# Patient Record
Sex: MALE | Race: WHITE | Employment: OTHER | URBAN - METROPOLITAN AREA
[De-identification: names, ages, dates, MRNs, and addresses within clinical notes are randomized per-mention and may not be internally consistent; named-entity substitution may affect disease eponyms.]

---

## 2021-02-11 ENCOUNTER — APPOINTMENT (OUTPATIENT)
Dept: GENERAL RADIOLOGY | Age: 86
DRG: 312 | End: 2021-02-11
Payer: MEDICARE

## 2021-02-11 ENCOUNTER — HOSPITAL ENCOUNTER (INPATIENT)
Age: 86
LOS: 2 days | Discharge: OTHER INSTITUTION | DRG: 312 | End: 2021-02-15
Attending: EMERGENCY MEDICINE | Admitting: INTERNAL MEDICINE
Payer: MEDICARE

## 2021-02-11 ENCOUNTER — APPOINTMENT (OUTPATIENT)
Dept: CT IMAGING | Age: 86
DRG: 312 | End: 2021-02-11
Payer: MEDICARE

## 2021-02-11 DIAGNOSIS — R55 SYNCOPE, UNSPECIFIED SYNCOPE TYPE: Primary | ICD-10-CM
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
## 2021-02-11 DIAGNOSIS — S09.90XA CLOSED HEAD INJURY, INITIAL ENCOUNTER: ICD-10-CM

## 2021-02-11 DIAGNOSIS — S00.01XA ABRASION OF SCALP, INITIAL ENCOUNTER: ICD-10-CM

## 2021-02-11 LAB
ALBUMIN SERPL-MCNC: 3.6 G/DL (ref 3.5–5.2)
ALP BLD-CCNC: 102 U/L (ref 40–129)
ALT SERPL-CCNC: 21 U/L (ref 0–40)
ANION GAP SERPL CALCULATED.3IONS-SCNC: 7 MMOL/L (ref 7–16)
AST SERPL-CCNC: 18 U/L (ref 0–39)
BASOPHILS ABSOLUTE: 0.02 E9/L (ref 0–0.2)
BASOPHILS RELATIVE PERCENT: 0.2 % (ref 0–2)
BILIRUB SERPL-MCNC: 0.4 MG/DL (ref 0–1.2)
BUN BLDV-MCNC: 13 MG/DL (ref 8–23)
CALCIUM SERPL-MCNC: 8.9 MG/DL (ref 8.6–10.2)
CHLORIDE BLD-SCNC: 104 MMOL/L (ref 98–107)
CO2: 29 MMOL/L (ref 22–29)
CREAT SERPL-MCNC: 1 MG/DL (ref 0.7–1.2)
EKG ATRIAL RATE: 80 BPM
EKG ATRIAL RATE: 82 BPM
EKG P AXIS: 49 DEGREES
EKG P AXIS: 50 DEGREES
EKG P-R INTERVAL: 136 MS
EKG P-R INTERVAL: 164 MS
EKG Q-T INTERVAL: 358 MS
EKG Q-T INTERVAL: 364 MS
EKG QRS DURATION: 82 MS
EKG QRS DURATION: 84 MS
EKG QTC CALCULATION (BAZETT): 412 MS
EKG QTC CALCULATION (BAZETT): 425 MS
EKG R AXIS: 54 DEGREES
EKG R AXIS: 73 DEGREES
EKG T AXIS: 51 DEGREES
EKG T AXIS: 68 DEGREES
EKG VENTRICULAR RATE: 80 BPM
EKG VENTRICULAR RATE: 82 BPM
EOSINOPHILS ABSOLUTE: 0.11 E9/L (ref 0.05–0.5)
EOSINOPHILS RELATIVE PERCENT: 1.2 % (ref 0–6)
GFR AFRICAN AMERICAN: >60
GFR NON-AFRICAN AMERICAN: >60 ML/MIN/1.73
GLUCOSE BLD-MCNC: 228 MG/DL (ref 74–99)
HCT VFR BLD CALC: 39.5 % (ref 37–54)
HEMOGLOBIN: 12.5 G/DL (ref 12.5–16.5)
IMMATURE GRANULOCYTES #: 0.1 E9/L
IMMATURE GRANULOCYTES %: 1.1 % (ref 0–5)
LYMPHOCYTES ABSOLUTE: 1.04 E9/L (ref 1.5–4)
LYMPHOCYTES RELATIVE PERCENT: 11.2 % (ref 20–42)
MCH RBC QN AUTO: 30.7 PG (ref 26–35)
MCHC RBC AUTO-ENTMCNC: 31.6 % (ref 32–34.5)
MCV RBC AUTO: 97.1 FL (ref 80–99.9)
METER GLUCOSE: 197 MG/DL (ref 74–99)
METER GLUCOSE: 199 MG/DL (ref 74–99)
MONOCYTES ABSOLUTE: 0.57 E9/L (ref 0.1–0.95)
MONOCYTES RELATIVE PERCENT: 6.1 % (ref 2–12)
NEUTROPHILS ABSOLUTE: 7.47 E9/L (ref 1.8–7.3)
NEUTROPHILS RELATIVE PERCENT: 80.2 % (ref 43–80)
PDW BLD-RTO: 13.2 FL (ref 11.5–15)
PLATELET # BLD: 202 E9/L (ref 130–450)
PMV BLD AUTO: 11.2 FL (ref 7–12)
POTASSIUM SERPL-SCNC: 4.4 MMOL/L (ref 3.5–5)
PRO-BNP: 100 PG/ML (ref 0–450)
RBC # BLD: 4.07 E12/L (ref 3.8–5.8)
SODIUM BLD-SCNC: 140 MMOL/L (ref 132–146)
TOTAL PROTEIN: 5.7 G/DL (ref 6.4–8.3)
TROPONIN: <0.01 NG/ML (ref 0–0.03)
WBC # BLD: 9.3 E9/L (ref 4.5–11.5)

## 2021-02-11 PROCEDURE — 99285 EMERGENCY DEPT VISIT HI MDM: CPT

## 2021-02-11 PROCEDURE — 2580000003 HC RX 258: Performed by: INTERNAL MEDICINE

## 2021-02-11 PROCEDURE — 96372 THER/PROPH/DIAG INJ SC/IM: CPT

## 2021-02-11 PROCEDURE — 71045 X-RAY EXAM CHEST 1 VIEW: CPT

## 2021-02-11 PROCEDURE — 70450 CT HEAD/BRAIN W/O DYE: CPT

## 2021-02-11 PROCEDURE — 93010 ELECTROCARDIOGRAM REPORT: CPT | Performed by: INTERNAL MEDICINE

## 2021-02-11 PROCEDURE — 90715 TDAP VACCINE 7 YRS/> IM: CPT | Performed by: EMERGENCY MEDICINE

## 2021-02-11 PROCEDURE — 83880 ASSAY OF NATRIURETIC PEPTIDE: CPT

## 2021-02-11 PROCEDURE — 82962 GLUCOSE BLOOD TEST: CPT

## 2021-02-11 PROCEDURE — G0378 HOSPITAL OBSERVATION PER HR: HCPCS

## 2021-02-11 PROCEDURE — 6360000002 HC RX W HCPCS: Performed by: EMERGENCY MEDICINE

## 2021-02-11 PROCEDURE — 85025 COMPLETE CBC W/AUTO DIFF WBC: CPT

## 2021-02-11 PROCEDURE — 6370000000 HC RX 637 (ALT 250 FOR IP): Performed by: EMERGENCY MEDICINE

## 2021-02-11 PROCEDURE — 84484 ASSAY OF TROPONIN QUANT: CPT

## 2021-02-11 PROCEDURE — 72125 CT NECK SPINE W/O DYE: CPT

## 2021-02-11 PROCEDURE — 80053 COMPREHEN METABOLIC PANEL: CPT

## 2021-02-11 PROCEDURE — 93005 ELECTROCARDIOGRAM TRACING: CPT | Performed by: EMERGENCY MEDICINE

## 2021-02-11 PROCEDURE — 6370000000 HC RX 637 (ALT 250 FOR IP): Performed by: INTERNAL MEDICINE

## 2021-02-11 PROCEDURE — 6360000002 HC RX W HCPCS: Performed by: INTERNAL MEDICINE

## 2021-02-11 PROCEDURE — 90471 IMMUNIZATION ADMIN: CPT | Performed by: EMERGENCY MEDICINE

## 2021-02-11 RX ORDER — DEXTROSE MONOHYDRATE 50 MG/ML
100 INJECTION, SOLUTION INTRAVENOUS PRN
Status: DISCONTINUED | OUTPATIENT
Start: 2021-02-11 | End: 2021-02-15 | Stop reason: HOSPADM

## 2021-02-11 RX ORDER — CITALOPRAM 20 MG/1
20 TABLET ORAL DAILY
Status: DISCONTINUED | OUTPATIENT
Start: 2021-02-11 | End: 2021-02-15 | Stop reason: HOSPADM

## 2021-02-11 RX ORDER — GLIMEPIRIDE 2 MG/1
2 TABLET ORAL
Status: DISCONTINUED | OUTPATIENT
Start: 2021-02-12 | End: 2021-02-15 | Stop reason: HOSPADM

## 2021-02-11 RX ORDER — ASPIRIN 81 MG/1
81 TABLET, CHEWABLE ORAL DAILY
Status: DISCONTINUED | OUTPATIENT
Start: 2021-02-11 | End: 2021-02-15 | Stop reason: HOSPADM

## 2021-02-11 RX ORDER — SODIUM CHLORIDE 0.9 % (FLUSH) 0.9 %
10 SYRINGE (ML) INJECTION EVERY 12 HOURS SCHEDULED
Status: DISCONTINUED | OUTPATIENT
Start: 2021-02-11 | End: 2021-02-15 | Stop reason: HOSPADM

## 2021-02-11 RX ORDER — POLYETHYLENE GLYCOL 3350 17 G/17G
17 POWDER, FOR SOLUTION ORAL DAILY PRN
Status: DISCONTINUED | OUTPATIENT
Start: 2021-02-11 | End: 2021-02-15 | Stop reason: HOSPADM

## 2021-02-11 RX ORDER — CHOLECALCIFEROL (VITAMIN D3) 50 MCG
2000 TABLET ORAL DAILY
Status: DISCONTINUED | OUTPATIENT
Start: 2021-02-11 | End: 2021-02-15 | Stop reason: HOSPADM

## 2021-02-11 RX ORDER — BUPROPION HYDROCHLORIDE 75 MG/1
75 TABLET ORAL 2 TIMES DAILY
Status: DISCONTINUED | OUTPATIENT
Start: 2021-02-11 | End: 2021-02-15 | Stop reason: HOSPADM

## 2021-02-11 RX ORDER — SODIUM CHLORIDE 0.9 % (FLUSH) 0.9 %
10 SYRINGE (ML) INJECTION PRN
Status: DISCONTINUED | OUTPATIENT
Start: 2021-02-11 | End: 2021-02-15 | Stop reason: HOSPADM

## 2021-02-11 RX ORDER — TIMOLOL MALEATE 5 MG/ML
1 SOLUTION/ DROPS OPHTHALMIC 2 TIMES DAILY
Status: DISCONTINUED | OUTPATIENT
Start: 2021-02-11 | End: 2021-02-15 | Stop reason: HOSPADM

## 2021-02-11 RX ORDER — TIMOLOL MALEATE 5 MG/ML
1 SOLUTION/ DROPS OPHTHALMIC 2 TIMES DAILY
Status: ON HOLD | COMMUNITY
End: 2021-02-15 | Stop reason: HOSPADM

## 2021-02-11 RX ORDER — SODIUM CHLORIDE 450 MG/100ML
INJECTION, SOLUTION INTRAVENOUS CONTINUOUS
Status: DISCONTINUED | OUTPATIENT
Start: 2021-02-11 | End: 2021-02-15

## 2021-02-11 RX ORDER — PROMETHAZINE HYDROCHLORIDE 25 MG/1
12.5 TABLET ORAL EVERY 6 HOURS PRN
Status: DISCONTINUED | OUTPATIENT
Start: 2021-02-11 | End: 2021-02-15 | Stop reason: HOSPADM

## 2021-02-11 RX ORDER — ACETAMINOPHEN 325 MG/1
650 TABLET ORAL EVERY 6 HOURS PRN
Status: DISCONTINUED | OUTPATIENT
Start: 2021-02-11 | End: 2021-02-15 | Stop reason: HOSPADM

## 2021-02-11 RX ORDER — DIAPER,BRIEF,INFANT-TODD,DISP
EACH MISCELLANEOUS ONCE
Status: COMPLETED | OUTPATIENT
Start: 2021-02-11 | End: 2021-02-11

## 2021-02-11 RX ORDER — LATANOPROST 50 UG/ML
1 SOLUTION/ DROPS OPHTHALMIC NIGHTLY
Status: DISCONTINUED | OUTPATIENT
Start: 2021-02-11 | End: 2021-02-15 | Stop reason: HOSPADM

## 2021-02-11 RX ORDER — NICOTINE POLACRILEX 4 MG
15 LOZENGE BUCCAL PRN
Status: DISCONTINUED | OUTPATIENT
Start: 2021-02-11 | End: 2021-02-15 | Stop reason: HOSPADM

## 2021-02-11 RX ORDER — ONDANSETRON 2 MG/ML
4 INJECTION INTRAMUSCULAR; INTRAVENOUS EVERY 6 HOURS PRN
Status: DISCONTINUED | OUTPATIENT
Start: 2021-02-11 | End: 2021-02-15 | Stop reason: HOSPADM

## 2021-02-11 RX ORDER — SIMVASTATIN 10 MG
20 TABLET ORAL NIGHTLY
Status: DISCONTINUED | OUTPATIENT
Start: 2021-02-11 | End: 2021-02-15 | Stop reason: HOSPADM

## 2021-02-11 RX ORDER — DOCUSATE SODIUM 100 MG/1
100 CAPSULE, LIQUID FILLED ORAL 2 TIMES DAILY
Status: DISCONTINUED | OUTPATIENT
Start: 2021-02-11 | End: 2021-02-15 | Stop reason: HOSPADM

## 2021-02-11 RX ORDER — SIMVASTATIN 20 MG
20 TABLET ORAL EVERY MORNING
COMMUNITY

## 2021-02-11 RX ORDER — ACETAMINOPHEN 650 MG/1
650 SUPPOSITORY RECTAL EVERY 6 HOURS PRN
Status: DISCONTINUED | OUTPATIENT
Start: 2021-02-11 | End: 2021-02-15 | Stop reason: HOSPADM

## 2021-02-11 RX ORDER — DEXTROSE MONOHYDRATE 25 G/50ML
12.5 INJECTION, SOLUTION INTRAVENOUS PRN
Status: DISCONTINUED | OUTPATIENT
Start: 2021-02-11 | End: 2021-02-15 | Stop reason: HOSPADM

## 2021-02-11 RX ADMIN — Medication 2000 UNITS: at 17:06

## 2021-02-11 RX ADMIN — ASPIRIN 81 MG: 81 TABLET, CHEWABLE ORAL at 17:06

## 2021-02-11 RX ADMIN — SODIUM CHLORIDE, PRESERVATIVE FREE 10 ML: 5 INJECTION INTRAVENOUS at 19:51

## 2021-02-11 RX ADMIN — SODIUM CHLORIDE: 4.5 INJECTION, SOLUTION INTRAVENOUS at 17:25

## 2021-02-11 RX ADMIN — DOCUSATE SODIUM 100 MG: 100 CAPSULE, LIQUID FILLED ORAL at 20:25

## 2021-02-11 RX ADMIN — BUPROPION HYDROCHLORIDE 75 MG: 75 TABLET, FILM COATED ORAL at 19:50

## 2021-02-11 RX ADMIN — LATANOPROST 1 DROP: 50 SOLUTION OPHTHALMIC at 19:51

## 2021-02-11 RX ADMIN — CITALOPRAM 20 MG: 20 TABLET, FILM COATED ORAL at 17:07

## 2021-02-11 RX ADMIN — INSULIN LISPRO 1 UNITS: 100 INJECTION, SOLUTION INTRAVENOUS; SUBCUTANEOUS at 17:06

## 2021-02-11 RX ADMIN — BACITRACIN ZINC: 500 OINTMENT TOPICAL at 09:47

## 2021-02-11 RX ADMIN — TIMOLOL MALEATE 1 DROP: 5 SOLUTION OPHTHALMIC at 19:51

## 2021-02-11 RX ADMIN — TETANUS TOXOID, REDUCED DIPHTHERIA TOXOID AND ACELLULAR PERTUSSIS VACCINE, ADSORBED 0.5 ML: 5; 2.5; 8; 8; 2.5 SUSPENSION INTRAMUSCULAR at 09:47

## 2021-02-11 RX ADMIN — INSULIN LISPRO 1 UNITS: 100 INJECTION, SOLUTION INTRAVENOUS; SUBCUTANEOUS at 19:52

## 2021-02-11 RX ADMIN — ENOXAPARIN SODIUM 40 MG: 40 INJECTION SUBCUTANEOUS at 17:06

## 2021-02-11 NOTE — ED NOTES
Patient belongings, Jose Ma in patient belonging bag and on back of patient bed.       Pranav Aguirre RN  02/11/21 2894

## 2021-02-11 NOTE — PROGRESS NOTES
Debara Kayser was ordered simvastatin (Zocor) which is a nonformulary medication. Nurse is going to check with patient to see if home supply of this medication will be brought in to the hospital for inpatient use. A pharmacist will follow-up with the nurse of the patient to assess the capability of the patient to bring in the medication. If it is determined that the patient cannot supply the medication and it is not available to be dispensed from the pharmacy, a call will be placed to the ordering provider to discuss alternative options.      Mina Reyes, AngeliqueD  02/11/21 3:53 PM

## 2021-02-11 NOTE — ED NOTES
Bed: 05  Expected date:   Expected time:   Means of arrival:   Comments:  fadumo Vickers RN  02/11/21 0147

## 2021-02-11 NOTE — ED PROVIDER NOTES
Department of Emergency Medicine   ED  Provider Note  Admit Date/RoomTime: 2/11/2021  8:56 AM  ED Room: 4508/4508-A          History of Present Illness:  2/11/21, Time: 9:33 AM EST  Chief Complaint   Patient presents with   [address] HAD SHORT POS LOC PT AMNESTIC TO EVENT AND 2ND TIME FELL THIS AM ACCORDING TO EMS    Facial Laceration     FOREHEAD                 Tracee Ward is a 80 y.o. male presenting to the ED for fall at home, beginning 1 hour. The complaint has been intermittent, moderate in severity, and worsened by nothing. Patient presents for a fall. Patient does not remember the fall, states he was at home thinks he was walking down steps to go outside. Positive loss of consciousness. Does not recall if he lost consciousness before the fall or after the fall. EMS reports the patient had 2 falls this morning but patient is unsure of this. He is on daily aspirin. There is signs of craniofacial trauma with abrasions on the head. He is unsure of his last tetanus but is otherwise orientated. Review of Systems:   Pertinent positives and negatives are stated within HPI, all other systems reviewed and are negative.        --------------------------------------------- PAST HISTORY ---------------------------------------------  Past Medical History:  has a past medical history of ADD (attention deficit disorder), Anxiety, B12 deficiency, B12 deficiency, Depression, Diabetes ([facility] [address] Diabetes mellitus ([facility] [address] Hyperlipidemia, and Hypertension. Past Surgical History:  has a past surgical history that includes Hand surgery. Social History:  reports that he has quit smoking. He has never used smokeless tobacco. He reports that he does not drink alcohol or use drugs. Family History: family history is not on file. . Unless otherwise noted, family history is non contributory    The patients home medications have been reviewed.     Allergies: Patient has no known allergies. ---------------------------------------------------PHYSICAL EXAM--------------------------------------    Constitutional/General: Alert and oriented x3  Head: Normocephalic there is a frontal cephalhematoma with abrasion on the front right as well as the superior central area of the head. No lacerations for closure  Eyes: PERRL, EOMI, sclera non icteric  Mouth: Oropharynx clear, handling secretions, no trismus, no asymmetry of the posterior oropharynx or uvular edema  Neck: Supple, immobilized in a cervical collar no stridor, no meningeal signs  Respiratory: Lungs clear to auscultation bilaterally,Not in respiratory distress  Cardiovascular:  Regular rate. Regular rhythm. 2+ distal pulses. Equal extremity pulses. Chest: No chest wall tenderness  GI:  Abdomen Soft, Non tender, Non distended. No rebound, guarding, or rigidity. Musculoskeletal: Moves all extremities x 4. Warm and well perfused, no clubbing, cyanosis, or edema. Capillary refill <3 seconds likely skin tear on the right forearm which is currently dressed. Neurovascularly intact. He is able to flex all large joints no signs of long bone deformities  Integument: skin warm and dry. No rashes. Neurologic: GCS 15, no focal deficits, symmetric strength 5/5 in the upper and lower extremities bilaterally  Psychiatric: Normal Affect      EKG: Interpreted by emergency department physician, Dr. Gifford Speaks      This EKG is signed and interpreted by me. Rate: 80  Rhythm: Sinus  Interpretation: Sinus rhythm, MA is 136, QRS is 84, QTc is 412. Motion artifact no other acute findings no prior for comparison  Comparison: no previous EKG available        -------------------------------------------------- RESULTS -------------------------------------------------  I have personally reviewed all laboratory and imaging results for this patient. Results are listed below.      LABS: (Lab results interpreted by me)  Results for orders placed or performed during the hospital encounter of 02/11/21   Troponin   Result Value Ref Range    Troponin <0.01 0.00 - 0.03 ng/mL   CBC Auto Differential   Result Value Ref Range    WBC 9.3 4.5 - 11.5 E9/L    RBC 4.07 3.80 - 5.80 E12/L    Hemoglobin 12.5 12.5 - 16.5 g/dL    Hematocrit 39.5 37.0 - 54.0 %    MCV 97.1 80.0 - 99.9 fL    MCH 30.7 26.0 - 35.0 pg    MCHC 31.6 (L) 32.0 - 34.5 %    RDW 13.2 11.5 - 15.0 fL    Platelets 499 922 - 156 E9/L    MPV 11.2 7.0 - 12.0 fL    Neutrophils % 80.2 (H) 43.0 - 80.0 %    Immature Granulocytes % 1.1 0.0 - 5.0 %    Lymphocytes % 11.2 (L) 20.0 - 42.0 %    Monocytes % 6.1 2.0 - 12.0 %    Eosinophils % 1.2 0.0 - 6.0 %    Basophils % 0.2 0.0 - 2.0 %    Neutrophils Absolute 7.47 (H) 1.80 - 7.30 E9/L    Immature Granulocytes # 0.10 E9/L    Lymphocytes Absolute 1.04 (L) 1.50 - 4.00 E9/L    Monocytes Absolute 0.57 0.10 - 0.95 E9/L    Eosinophils Absolute 0.11 0.05 - 0.50 E9/L    Basophils Absolute 0.02 0.00 - 0.20 E9/L   Comprehensive Metabolic Panel   Result Value Ref Range    Sodium 140 132 - 146 mmol/L    Potassium 4.4 3.5 - 5.0 mmol/L    Chloride 104 98 - 107 mmol/L    CO2 29 22 - 29 mmol/L    Anion Gap 7 7 - 16 mmol/L    Glucose 228 (H) 74 - 99 mg/dL    BUN 13 8 - 23 mg/dL    CREATININE 1.0 0.7 - 1.2 mg/dL    GFR Non-African American >60 >=60 mL/min/1.73    GFR African American >60     Calcium 8.9 8.6 - 10.2 mg/dL    Total Protein 5.7 (L) 6.4 - 8.3 g/dL    Albumin 3.6 3.5 - 5.2 g/dL    Total Bilirubin 0.4 0.0 - 1.2 mg/dL    Alkaline Phosphatase 102 40 - 129 U/L    ALT 21 0 - 40 U/L    AST 18 0 - 39 U/L   Brain Natriuretic Peptide   Result Value Ref Range    Pro- 0 - 450 pg/mL   EKG 12 Lead   Result Value Ref Range    Ventricular Rate 82 BPM    Atrial Rate 82 BPM    P-R Interval 164 ms    QRS Duration 82 ms    Q-T Interval 364 ms    QTc Calculation (Bazett) 425 ms    P Axis 50 degrees    R Axis 73 degrees    T Axis 68 degrees   EKG 12 Lead   Result Value Ref Range    Ventricular tablet 2 mg (has no administration in time range)   latanoprost (XALATAN) 0.005 % ophthalmic solution 1 drop (has no administration in time range)   simvastatin (ZOCOR) tablet 20 mg (has no administration in time range)   timolol (TIMOPTIC) 0.5 % ophthalmic solution 1 drop (has no administration in time range)   aspirin chewable tablet 81 mg (has no administration in time range)   sodium chloride flush 0.9 % injection 10 mL (has no administration in time range)   sodium chloride flush 0.9 % injection 10 mL (has no administration in time range)   enoxaparin (LOVENOX) injection 40 mg (has no administration in time range)   promethazine (PHENERGAN) tablet 12.5 mg (has no administration in time range)     Or   ondansetron (ZOFRAN) injection 4 mg (has no administration in time range)   polyethylene glycol (GLYCOLAX) packet 17 g (has no administration in time range)   acetaminophen (TYLENOL) tablet 650 mg (has no administration in time range)     Or   acetaminophen (TYLENOL) suppository 650 mg (has no administration in time range)   insulin lispro (HUMALOG) injection vial 0-6 Units (has no administration in time range)   insulin lispro (HUMALOG) injection vial 0-3 Units (has no administration in time range)   glucose (GLUTOSE) 40 % oral gel 15 g (has no administration in time range)   dextrose 50 % IV solution (has no administration in time range)   glucagon (rDNA) injection 1 mg (has no administration in time range)   dextrose 5 % solution (has no administration in time range)   Tetanus-Diphth-Acell Pertussis (BOOSTRIX) injection 0.5 mL (0.5 mLs Intramuscular Given 2/11/21 0947)   bacitracin zinc ointment ( Topical Given 2/11/21 0947)                    Medical Decision Making:     I, Dr. Chi Bain am the primary provider of record    No acute traumatic intracranial injuries identified. Syncopal episode strongly considered.   Lives at home alone, full code, spoke with medicine patient kept for further care      Re-Evaluations:          Re-evaluation. Patients symptoms are improving  Repeat physical examination is improved        This patient's ED course included: a personal history and physicial examination, re-evaluation prior to disposition, IV medications, cardiac monitoring, continuous pulse oximetry and complex medical decision making and emergency management    This patient has been closely monitored during their ED course. Consultations:  Sharona Gipson for Nicole Cotto MD       Critical Care:         Counseling: The emergency provider has spoken with the patient and discussed todays results, in addition to providing specific details for the plan of care and counseling regarding the diagnosis and prognosis. Questions are answered at this time and they are agreeable with the plan.       --------------------------------- IMPRESSION AND DISPOSITION ---------------------------------    IMPRESSION  1. Syncope, unspecified syncope type    2. Fall at home, initial encounter    3. Abrasion of scalp, initial encounter    4. Closed head injury, initial encounter        DISPOSITION  Disposition: Admit to telemetry  Patient condition is stable        NOTE: This report was transcribed using voice recognition software.  Every effort was made to ensure accuracy; however, inadvertent computerized transcription errors may be present       Opal Landers DO  02/11/21 [address] Danie Vega DO  02/11/21 0013

## 2021-02-12 LAB
BASOPHILS ABSOLUTE: 0.03 E9/L (ref 0–0.2)
BASOPHILS RELATIVE PERCENT: 0.2 % (ref 0–2)
EOSINOPHILS ABSOLUTE: 0.01 E9/L (ref 0.05–0.5)
EOSINOPHILS RELATIVE PERCENT: 0.1 % (ref 0–6)
HCT VFR BLD CALC: 34.4 % (ref 37–54)
HEMOGLOBIN: 11.2 G/DL (ref 12.5–16.5)
IMMATURE GRANULOCYTES #: 0.08 E9/L
IMMATURE GRANULOCYTES %: 0.6 % (ref 0–5)
LYMPHOCYTES ABSOLUTE: 1.31 E9/L (ref 1.5–4)
LYMPHOCYTES RELATIVE PERCENT: 10.6 % (ref 20–42)
MCH RBC QN AUTO: 31.2 PG (ref 26–35)
MCHC RBC AUTO-ENTMCNC: 32.6 % (ref 32–34.5)
MCV RBC AUTO: 95.8 FL (ref 80–99.9)
METER GLUCOSE: 109 MG/DL (ref 74–99)
METER GLUCOSE: 121 MG/DL (ref 74–99)
METER GLUCOSE: 129 MG/DL (ref 74–99)
METER GLUCOSE: 152 MG/DL (ref 74–99)
MONOCYTES ABSOLUTE: 1.09 E9/L (ref 0.1–0.95)
MONOCYTES RELATIVE PERCENT: 8.8 % (ref 2–12)
NEUTROPHILS ABSOLUTE: 9.87 E9/L (ref 1.8–7.3)
NEUTROPHILS RELATIVE PERCENT: 79.7 % (ref 43–80)
PDW BLD-RTO: 13.2 FL (ref 11.5–15)
PLATELET # BLD: 178 E9/L (ref 130–450)
PMV BLD AUTO: 11.5 FL (ref 7–12)
RBC # BLD: 3.59 E12/L (ref 3.8–5.8)
WBC # BLD: 12.4 E9/L (ref 4.5–11.5)

## 2021-02-12 PROCEDURE — 36415 COLL VENOUS BLD VENIPUNCTURE: CPT

## 2021-02-12 PROCEDURE — 96372 THER/PROPH/DIAG INJ SC/IM: CPT

## 2021-02-12 PROCEDURE — 6370000000 HC RX 637 (ALT 250 FOR IP): Performed by: INTERNAL MEDICINE

## 2021-02-12 PROCEDURE — G0378 HOSPITAL OBSERVATION PER HR: HCPCS

## 2021-02-12 PROCEDURE — 99222 1ST HOSP IP/OBS MODERATE 55: CPT | Performed by: INTERNAL MEDICINE

## 2021-02-12 PROCEDURE — 85025 COMPLETE CBC W/AUTO DIFF WBC: CPT

## 2021-02-12 PROCEDURE — 82962 GLUCOSE BLOOD TEST: CPT

## 2021-02-12 PROCEDURE — 6360000002 HC RX W HCPCS: Performed by: INTERNAL MEDICINE

## 2021-02-12 PROCEDURE — 2580000003 HC RX 258: Performed by: INTERNAL MEDICINE

## 2021-02-12 PROCEDURE — APPSS60 APP SPLIT SHARED TIME 46-60 MINUTES: Performed by: PHYSICIAN ASSISTANT

## 2021-02-12 RX ORDER — MIDODRINE HYDROCHLORIDE 5 MG/1
5 TABLET ORAL
Status: DISCONTINUED | OUTPATIENT
Start: 2021-02-12 | End: 2021-02-15 | Stop reason: HOSPADM

## 2021-02-12 RX ADMIN — POLYETHYLENE GLYCOL 3350 17 G: 17 POWDER, FOR SOLUTION ORAL at 20:03

## 2021-02-12 RX ADMIN — MIDODRINE HYDROCHLORIDE 5 MG: 5 TABLET ORAL at 20:03

## 2021-02-12 RX ADMIN — BUPROPION HYDROCHLORIDE 75 MG: 75 TABLET, FILM COATED ORAL at 20:03

## 2021-02-12 RX ADMIN — SODIUM CHLORIDE, PRESERVATIVE FREE 10 ML: 5 INJECTION INTRAVENOUS at 20:04

## 2021-02-12 RX ADMIN — ENOXAPARIN SODIUM 40 MG: 40 INJECTION SUBCUTANEOUS at 10:17

## 2021-02-12 RX ADMIN — GLIMEPIRIDE 2 MG: 2 TABLET ORAL at 06:15

## 2021-02-12 RX ADMIN — SODIUM CHLORIDE, PRESERVATIVE FREE 10 ML: 5 INJECTION INTRAVENOUS at 10:18

## 2021-02-12 RX ADMIN — MIDODRINE HYDROCHLORIDE 5 MG: 5 TABLET ORAL at 12:05

## 2021-02-12 RX ADMIN — TIMOLOL MALEATE 1 DROP: 5 SOLUTION OPHTHALMIC at 10:17

## 2021-02-12 RX ADMIN — TIMOLOL MALEATE 1 DROP: 5 SOLUTION OPHTHALMIC at 20:03

## 2021-02-12 RX ADMIN — ASPIRIN 81 MG: 81 TABLET, CHEWABLE ORAL at 10:17

## 2021-02-12 RX ADMIN — CITALOPRAM 20 MG: 20 TABLET, FILM COATED ORAL at 10:17

## 2021-02-12 RX ADMIN — DOCUSATE SODIUM 100 MG: 100 CAPSULE, LIQUID FILLED ORAL at 10:17

## 2021-02-12 RX ADMIN — LATANOPROST 1 DROP: 50 SOLUTION OPHTHALMIC at 20:03

## 2021-02-12 RX ADMIN — BUPROPION HYDROCHLORIDE 75 MG: 75 TABLET, FILM COATED ORAL at 10:17

## 2021-02-12 RX ADMIN — DOCUSATE SODIUM 100 MG: 100 CAPSULE, LIQUID FILLED ORAL at 20:03

## 2021-02-12 RX ADMIN — Medication 2000 UNITS: at 06:15

## 2021-02-12 NOTE — H&P
Lucita Villasenor is a 80 y.o. male  Chief Complaint   Patient presents with    Fall      [address] [facility] POS LOC PT AMNESTIC TO EVENT AND 2ND TIME FELL THIS AM ACCORDING TO EMS    Facial Laceration     FOREHEAD      HPI  As above, he is awake and alert this am.  He lives in his home with his wife. He fell down the stairs yesterday, he admits to hitting his head and passing out. Last night, he tried to get out of bed and passed out. He has not been out of bed since. He denies any cp, sob, n/v or HA. No current facility-administered medications on file prior to encounter. Current Outpatient Medications on File Prior to Encounter   Medication Sig Dispense Refill    simvastatin (ZOCOR) 20 MG tablet Take 20 mg by mouth nightly      timolol (TIMOPTIC) 0.5 % ophthalmic solution Place 1 drop into the right eye 2 times daily      buPROPion (WELLBUTRIN) 75 MG tablet Take 1 tablet by mouth 2 times daily 180 tablet 1    citalopram (CELEXA) 20 MG tablet Take 1 tablet by mouth daily 90 tablet 1    glimepiride (AMARYL) 4 MG tablet Take 0.5 tablets by mouth every morning (before breakfast) 90 tablet 1    latanoprost (XALATAN) 0.005 % ophthalmic solution Place 1 drop into the right eye nightly   5    docusate sodium (COLACE) 100 MG capsule Take 100 mg by mouth 2 times daily      aspirin 81 MG chewable tablet Take 81 mg by mouth daily.  Cholecalciferol (VITAMIN D) 2000 UNITS CAPS capsule Take 2,000 Units by mouth Daily       Multiple Vitamins-Minerals (MULTIVITAMIN PO) Take 1 tablet by mouth Daily.        Past Medical History:   Diagnosis Date    ADD (attention deficit disorder)     Anxiety     B12 deficiency     B12 deficiency     Depression     Diabetes ([facility] [address]     Diabetes mellitus ([facility] [address]     Hyperlipidemia     Hypertension      Past Surgical History:   Procedure Laterality Date    HAND SURGERY       Social History     Socioeconomic History    Marital status:  Spouse name: Not on file    Number of children: Not on file    Years of education: Not on file    Highest education level: Not on file   Occupational History    Not on file   Social Needs    Financial resource strain: Not on file    Food insecurity     Worry: Not on file     Inability: Not on file    Transportation needs     Medical: Not on file     Non-medical: Not on file   Tobacco Use    Smoking status: Former Smoker    Smokeless tobacco: Never Used   Substance and Sexual Activity    Alcohol use: No    Drug use: No    Sexual activity: Not on file   Lifestyle    Physical activity     Days per week: Not on file     Minutes per session: Not on file    Stress: Not on file   Relationships    Social connections     Talks on phone: Not on file     Gets together: Not on file     Attends [employer] service: Not on file     Active member of club or organization: Not on file     Attends meetings of clubs or organizations: Not on file     Relationship status: Not on file    Intimate partner violence     Fear of current or ex partner: Not on file     Emotionally abused: Not on file     Physically abused: Not on file     Forced sexual activity: Not on file   Other Topics Concern    Not on file   Social History Narrative    Not on file     No family history on file. ROS  Patient positive for  Falls, syncope, Bruise on R forehead   Patient denies any fever, chills, night sweats, weight changes   Denies headache, visual changes,   Denies dysphagia, odynophagia dysphonia,   Denies SOB, cough, sputum production,   Denies chest pain, pressure, orthopnea, palpitations,   Denies abd pain, N/V/D/C/melena, hematochezia,   Denies urinary frequency, urgency, dysuria, hematuria,   Denies any  paresthesias, weakness, seizure, Denies depression, anxiety.   OBJECTIVE  Vitals:    02/12/21 0047   BP:    Pulse:    Resp: 19   Temp: 98.3 °F (36.8 °C)   SpO2: 96%     Gen: AO3, NAD  Head:  R forehead with bruising PERRL, EOMIx2, no icterus, conjunctival injection  Neck: No JVD, carotid bruits, LAD, thyroid non-palpable  Heart: RRR with no murmurs, rubs, gallops  Lungs: CTA B/L, no W/R/R  Abd: soft, NT, ND, BS+, no G/R, no HSM  Ext: No C/C/E, pulses intact distally B/L  Neuro: CN 2-12 grossly intact with no focal deficits    ASSESSMENT  1. Syncope, unspecified syncope type    2. Fall at home, initial encounter    3. Abrasion of scalp, initial encounter    4. Closed head injury, initial encounter    5.  HTN  PLAN  Admit  Cardiology consulted  Monitor BP   ADDENDUM  Updated

## 2021-02-12 NOTE — CONSULTS
I independently performed an evaluation on the patient. I have reviewed the above documentation completed by the advance practitioner. Please see my additional contributions to the HPI, physical exam, assessment and medical decision making. Physical Exam   BP (!) 170/90   Pulse 117   Temp 98.3 °F (36.8 °C) (Temporal)   Resp 19   Ht 6' (1.829 m)   Wt 175 lb (79.4 kg)   SpO2 96%   BMI 23.73 kg/m²   Constitutional: Oriented to person, place, and time. Well-developed and well-nourished. No distress. Head: Normocephalic and atraumatic. Eyes: EOM are normal. Pupils are equal, round, and reactive to light. Neck: Normal range of motion. Neck supple. No hepatojugular reflux and no JVD present. Carotid bruit is not present. No tracheal deviation present. No thyromegaly present. Cardiovascular: Normal rate, regular rhythm, normal heart sounds and intact distal pulses. Exam reveals no gallop and no friction rub. No murmur heard. Pulmonary/Chest: Effort normal and breath sounds normal. No respiratory distress. No wheezes. No rales. No tenderness. Abdominal: Soft. Bowel sounds are normal. No distension and no mass. No tenderness. No rebound and no guarding. Musculoskeletal: Normal range of motion. No edema and no tenderness. Lymphadenopathy:   No cervical adenopathy. Neurological: Alert and oriented to person, place, and time. Skin: Skin is warm and dry. No rash noted. Not diaphoretic. No erythema. Psychiatric: Normal mood and affect. Behavior is normal.     See accompanying documentation for full consult. ASSESSMENT AND PLAN:  1. Syncope/Fall: EKG normal.     Orthos positive. Restart midodrine. Encourage PO intake. Monitor. Echo. If monitor and echo not illuminating then 30 day outpatient monitor. 2. Hypotension issues: Takes midodrine. 3. Lipids: Statin. 4. DM: Per primary service. Fabienne Tinoco D.O.   Cardiologist  Cardiology, [address] [facility]

## 2021-02-12 NOTE — CONSULTS
Inpatient Cardiology Consultation      Reason for Consult:  Syncope     Consulting Physician: Dr Ru Keenan     Requesting Physician:  Dr Courtney Velazquez    Date of Consultation: 2/12/2021    HISTORY OF PRESENT ILLNESS:   Mr Vanessa Hurley is a 79 yo male who is not previously known to [address] [place] [address] Cardiology. Past medical history includes hypertension with more recent hypotension on Midodrine,, hyperlipidemia, type 2 diabetes mellitus, anxiety, B 12 deficiency. Presented to [facility] 2/11/2021 following fall at home   VS: [id number]-136/67   Labs: WBC 9.3, H&H 12.5/39.5, Plt 202. K+ 4.4, BUN/SCr 13/1.0, glucose 228. troponin negative, proBNP 100. CT head: no acute intracranial process  CT cervical spine: no acute skeletal pathology. CXR: elevated of left hemidiaphragm,      Orthostatics:   111/65, 96 lying  74/45, 104 sitting  Repeat:   107/67, 93 lying   102/63, 86. sitting    His scalp laceration was cleaned and dressed in the ED. He was admitted to a telemetry monitoring floor. He was started on IV fluids 60/hr. Cardiology was asked to see the patient for syncopal event. He states that he was walking to the garage for something that he cannot recall and fell. He does nto recall the events of the fall or the events after. He does not remember at prodromal symptoms including lightheadedness/dizziness, shortness of breath or chest pain. He denies any previous falls or LOC at home prior to this event. He states that he lives at home with his wife and all their amenities are on the first floor. Their daughter in law helps them with their ADL. He is minimally active at home but denies any chest pain or shortness of breath. He does not wear long term oxygen and does not use ambulation assistance devices. He states that his appetite is \"so so\" and he drinks about 2 glasses of water daily.        Please note: past medical records were reviewed per electronic medical record (EMR) - see detailed reports under Past Medical/ Surgical History. Past Medical History:    1. H/o Hypertension with hypotension recently on Midodrine   2. Hyperlipidemia  3. Type 2 diabetes mellitus   4. Anxiety   5. Vitamin B12 deficiency   6. TTE 11/12016 Yazmin Leos):  Left ventricular size is grossly normal.  Normal LV segmental wall motion. Ejection fraction is visually estimated at 60%. Mild aortic  regurgitation is noted. Medications Prior to admit:  Prior to Admission medications    Medication Sig Start Date End Date Taking? Authorizing Provider   simvastatin (ZOCOR) 20 MG tablet Take 20 mg by mouth nightly   Yes Historical Provider, MD   timolol (TIMOPTIC) 0.5 % ophthalmic solution Place 1 drop into the right eye 2 times daily   Yes Historical Provider, MD   buPROPion (WELLBUTRIN) 75 MG tablet Take 1 tablet by mouth 2 times daily 3/9/20  Yes Yao Wilson MD   citalopram (CELEXA) 20 MG tablet Take 1 tablet by mouth daily 3/9/20  Yes Yao Wilson MD   glimepiride (AMARYL) 4 MG tablet Take 0.5 tablets by mouth every morning (before breakfast) 3/9/20  Yes Yao Wilson MD   latanoprost (XALATAN) 0.005 % ophthalmic solution Place 1 drop into the right eye nightly  7/23/19  Yes Historical Provider, MD   docusate sodium (COLACE) 100 MG capsule Take 100 mg by mouth 2 times daily    Historical Provider, MD   aspirin 81 MG chewable tablet Take 81 mg by mouth daily. Historical Provider, MD   Cholecalciferol (VITAMIN D) 2000 UNITS CAPS capsule Take 2,000 Units by mouth Daily     Historical Provider, MD   Multiple Vitamins-Minerals (MULTIVITAMIN PO) Take 1 tablet by mouth Daily.     Historical Provider, MD       Current Medications:    Current Facility-Administered Medications: buPROPion (WELLBUTRIN) tablet 75 mg, 75 mg, Oral, BID  vitamin D (CHOLECALCIFEROL) tablet 2,000 Units, 2,000 Units, Oral, Daily  citalopram (CELEXA) tablet 20 mg, 20 mg, Oral, Daily  docusate sodium (COLACE) capsule 100 mg, 100 mg, Oral, BID  glimepiride (AMARYL) tablet 2 mg, 2 mg, Oral, QAM AC  latanoprost (XALATAN) 0.005 % ophthalmic solution 1 drop, 1 drop, Right Eye, Nightly  simvastatin (ZOCOR) tablet 20 mg, 20 mg, Oral, Nightly  timolol (TIMOPTIC) 0.5 % ophthalmic solution 1 drop, 1 drop, Right Eye, BID  aspirin chewable tablet 81 mg, 81 mg, Oral, Daily  sodium chloride flush 0.9 % injection 10 mL, 10 mL, Intravenous, 2 times per day  sodium chloride flush 0.9 % injection 10 mL, 10 mL, Intravenous, PRN  enoxaparin (LOVENOX) injection 40 mg, 40 mg, Subcutaneous, Daily  promethazine (PHENERGAN) tablet 12.5 mg, 12.5 mg, Oral, Q6H PRN **OR** ondansetron (ZOFRAN) injection 4 mg, 4 mg, Intravenous, Q6H PRN  polyethylene glycol (GLYCOLAX) packet 17 g, 17 g, Oral, Daily PRN  acetaminophen (TYLENOL) tablet 650 mg, 650 mg, Oral, Q6H PRN **OR** acetaminophen (TYLENOL) suppository 650 mg, 650 mg, Rectal, Q6H PRN  insulin lispro (HUMALOG) injection vial 0-6 Units, 0-6 Units, Subcutaneous, TID WC  insulin lispro (HUMALOG) injection vial 0-3 Units, 0-3 Units, Subcutaneous, Nightly  glucose (GLUTOSE) 40 % oral gel 15 g, 15 g, Oral, PRN  dextrose 50 % IV solution, 12.5 g, Intravenous, PRN  glucagon (rDNA) injection 1 mg, 1 mg, Intramuscular, PRN  dextrose 5 % solution, 100 mL/hr, Intravenous, PRN  0.45 % sodium chloride infusion, , Intravenous, Continuous    Allergies:  Patient has no known allergies. Social History:    Lives with his wife in a two story home with all amenities on the first floor. Daughter in law helps with ADL - minimally active at baseline but denies CP/SOB. Does not use Long term O2 and does not use ambulation assistance devices. Denies tobacco, alcohol illicit drug use     Full code     Family History: This information was not obtained at this time as it is found noncontributory secondary to the patients advanced age.          REVIEW OF SYSTEMS:     · Constitutional: Denies fevers, chills, night sweats, and fatigue  · HEENT: Denies headaches, nose bleeds, and blurred vision,oral pain, abscess or lesion. · Musculoskeletal: Denies falls, pain to BLE with ambulation and edema to BLE. · Neurological: Denies dizziness and lightheadedness, numbness and tingling  · Cardiovascular: Denies chest pain, palpitations, and feelings of heart racing. · Respiratory: Denies orthopnea and PND, cough, MCPHERSON  · Gastrointestinal: Denies heartburn, nausea/vomiting, diarrhea and constipation, black/bloody, and tarry stools. · Genitourinary: Denies dysuria and hematuria  · Hematologic: Denies excessive bruising or bleeding  · Lymphatic: Denies lumps and bumps to neck, axilla, breast, and groin      PHYSICAL EXAM:   BP (!) 170/90   Pulse 117   Temp 98.3 °F (36.8 °C) (Temporal)   Resp 19   Ht 6' (1.829 m)   Wt 175 lb (79.4 kg)   SpO2 96%   BMI 23.73 kg/m²   CONST:  Well developed,  male who appears his stated age. Awake, alert, cooperative, no apparent distress  HEENT:   Head- Normocephalic, laceration clean and dry right frontal scalp    Eyes- Conjunctivae pink, anicteric  Throat- Oral mucosa pink and moist  Neck-  No stridor, trachea midline, no jugular venous distention. CHEST: Chest symmetrical and non-tender to palpation. RESPIRATORY: Lung sounds clear throughout fields bilaterally. No wheeze or rhonchi noted. CARDIOVASCULAR:     No carotid bruit noted bilaterally   Heart Ausculation- Regular rate and rhythm, systolic murmur. PV: No lower extremity edema. No varicosities. ABDOMEN: Soft, non-tender to light palpation. Bowel sounds present. MS: Good muscle strength and tone. No atrophy or abnormal movements. : Deferred   SKIN: Warm and dry no statis dermatitis or ulcers   NEURO / PSYCH: Oriented to person, place and time. Speech clear and appropriate. Follows all commands.  Pleasant affect     DATA:    ECG: SR HR 82  Tele strips:  SR HR 90s   Diagnostic:      Intake/Output Summary (Last 24 hours) at 2/12/2021 0811  Last data filed at 2/12/2021 0616  Gross per 24 hour   Intake 608 ml   Output --   Net 608 ml       Labs:   CBC:   Recent Labs     02/11/21  0949 02/12/21  0434   WBC 9.3 12.4*   HGB 12.5 11.2*   HCT 39.5 34.4*    178     BMP:   Recent Labs     02/11/21  0949      K 4.4   CO2 29   BUN 13   CREATININE 1.0   LABGLOM >60   CALCIUM 8.9     HgA1c:   Lab Results   Component Value Date    LABA1C 6.7 03/09/2020     CARDIAC ENZYMES:  Recent Labs     02/11/21  0949   TROPONINI <0.01     FASTING LIPID PANEL:  Lab Results   Component Value Date    CHOL 135 06/08/2018    HDL 52 06/08/2018    LDLCALC 68 06/08/2018    TRIG 71 06/08/2018     LIVER PROFILE:  Recent Labs     02/11/21 0949   AST 18   ALT 21   LABALBU 3.6       Assessment and Plan per Dr Lulu Armenta   Electronically signed by Pranay Almanza [facility] on 2/12/2021 at 8:11 AM     I independently performed an evaluation on the patient. I have reviewed the above documentation completed by the advance practitioner. Please see my additional contributions to the HPI, physical exam, assessment and medical decision making. Physical Exam   BP (!) 170/90   Pulse 117   Temp 98.3 °F (36.8 °C) (Temporal)   Resp 19   Ht 6' (1.829 m)   Wt 175 lb (79.4 kg)   SpO2 96%   BMI 23.73 kg/m²   Constitutional: Oriented to person, place, and time. Well-developed and well-nourished. No distress. Head: Normocephalic and atraumatic. Eyes: EOM are normal. Pupils are equal, round, and reactive to light. Neck: Normal range of motion. Neck supple. No hepatojugular reflux and no JVD present. Carotid bruit is not present. No tracheal deviation present. No thyromegaly present. Cardiovascular: Normal rate, regular rhythm, normal heart sounds and intact distal pulses. Exam reveals no gallop and no friction rub. No murmur heard. Pulmonary/Chest: Effort normal and breath sounds normal. No respiratory distress. No wheezes. No rales. No tenderness. Abdominal: Soft. Bowel sounds are normal. No distension and no mass.  No tenderness. No rebound and no guarding. Musculoskeletal: Normal range of motion. No edema and no tenderness. Lymphadenopathy:   No cervical adenopathy. Neurological: Alert and oriented to person, place, and time. Skin: Skin is warm and dry. No rash noted. Not diaphoretic. No erythema. Psychiatric: Normal mood and affect. Behavior is normal.     See accompanying documentation for full consult. ASSESSMENT AND PLAN:  1. Syncope/Fall: EKG normal.     Orthos positive. Restart midodrine. Encourage PO intake. Monitor. Echo. If monitor and echo not illuminating then 30 day outpatient monitor. 2. Hypotension issues: Takes midodrine. 3. Lipids: Statin. 4. DM: Per primary service. Cydney Conrad D.O.   Cardiologist  Cardiology, [address] [facility]

## 2021-02-12 NOTE — CARE COORDINATION
Transition of care: Met with patient at bedside and spoke to daughter in law Dave Constantino ([phone]) on phone to evaluate for discharge planning/needs. Patient alert and responded to questions but some responses were vague and inconsistent with re questioning so called daughter in law to help with assessment. Patient lives with his wife in a small one floor home . Wife is also 80years old. Per daughter in law Dave Constantino; she and her  do \"everythin\" for them. Sons name is Cecilia Godwin and he is only child. They cook meals that can be heated in the microwave, do all transportation. laundry and housekeeping. .  Patient wife uses a walker at all time. She monitored that patient took his meds after family set them up,  Patient was able to dress and shower. The home has grab rails at entrance and in the bathroom. There is a walk in shower with seat and hand held. Patient has a walker and cane but he does not use them. Patient has had falls over the last few years. Family have good insight regarding hired help or [address] as options for patient and wife and there is financial means for these options; but at this time plan is for patient to return home. His PCP is WALDO Urbina,  Ph[facility] is [facility] . Patient has never used home care or been in [facility]. Will follow for assist in discharge planning.

## 2021-02-13 PROBLEM — R55 SYNCOPE AND COLLAPSE: Status: ACTIVE | Noted: 2021-02-13

## 2021-02-13 LAB
ANION GAP SERPL CALCULATED.3IONS-SCNC: 9 MMOL/L (ref 7–16)
BASOPHILS ABSOLUTE: 0.02 E9/L (ref 0–0.2)
BASOPHILS RELATIVE PERCENT: 0.2 % (ref 0–2)
BUN BLDV-MCNC: 16 MG/DL (ref 8–23)
CALCIUM SERPL-MCNC: 8.7 MG/DL (ref 8.6–10.2)
CHLORIDE BLD-SCNC: 102 MMOL/L (ref 98–107)
CO2: 27 MMOL/L (ref 22–29)
CREAT SERPL-MCNC: 1 MG/DL (ref 0.7–1.2)
EOSINOPHILS ABSOLUTE: 0.06 E9/L (ref 0.05–0.5)
EOSINOPHILS RELATIVE PERCENT: 0.6 % (ref 0–6)
FOLATE: 5.7 NG/ML (ref 4.8–24.2)
GFR AFRICAN AMERICAN: >60
GFR NON-AFRICAN AMERICAN: >60 ML/MIN/1.73
GLUCOSE BLD-MCNC: 115 MG/DL (ref 74–99)
HCT VFR BLD CALC: 32.1 % (ref 37–54)
HCT VFR BLD CALC: 33.1 % (ref 37–54)
HEMOGLOBIN: 10.1 G/DL (ref 12.5–16.5)
HEMOGLOBIN: 10.7 G/DL (ref 12.5–16.5)
IMMATURE GRANULOCYTES #: 0.06 E9/L
IMMATURE GRANULOCYTES %: 0.6 % (ref 0–5)
IMMATURE RETIC FRACT: 12.4 % (ref 2.3–13.4)
IRON SATURATION: 24 % (ref 20–55)
IRON: 55 MCG/DL (ref 59–158)
LV EF: 65 %
LVEF MODALITY: NORMAL
LYMPHOCYTES ABSOLUTE: 0.84 E9/L (ref 1.5–4)
LYMPHOCYTES RELATIVE PERCENT: 8.4 % (ref 20–42)
MCH RBC QN AUTO: 29.9 PG (ref 26–35)
MCH RBC QN AUTO: 30.7 PG (ref 26–35)
MCHC RBC AUTO-ENTMCNC: 31.5 % (ref 32–34.5)
MCHC RBC AUTO-ENTMCNC: 32.3 % (ref 32–34.5)
MCV RBC AUTO: 95 FL (ref 80–99.9)
MCV RBC AUTO: 95.1 FL (ref 80–99.9)
METER GLUCOSE: 118 MG/DL (ref 74–99)
METER GLUCOSE: 72 MG/DL (ref 74–99)
METER GLUCOSE: 79 MG/DL (ref 74–99)
MONOCYTES ABSOLUTE: 1.21 E9/L (ref 0.1–0.95)
MONOCYTES RELATIVE PERCENT: 12.1 % (ref 2–12)
NEUTROPHILS ABSOLUTE: 7.8 E9/L (ref 1.8–7.3)
NEUTROPHILS RELATIVE PERCENT: 78.1 % (ref 43–80)
PDW BLD-RTO: 13.2 FL (ref 11.5–15)
PDW BLD-RTO: 13.2 FL (ref 11.5–15)
PLATELET # BLD: 151 E9/L (ref 130–450)
PLATELET # BLD: 154 E9/L (ref 130–450)
PMV BLD AUTO: 11.2 FL (ref 7–12)
PMV BLD AUTO: 11.5 FL (ref 7–12)
POTASSIUM SERPL-SCNC: 4.2 MMOL/L (ref 3.5–5)
RBC # BLD: 3.38 E12/L (ref 3.8–5.8)
RBC # BLD: 3.48 E12/L (ref 3.8–5.8)
RETIC HGB EQUIVALENT: 35.8 PG (ref 28.2–36.6)
RETICULOCYTE ABSOLUTE COUNT: 0.05 E12/L
RETICULOCYTE COUNT PCT: 1.5 % (ref 0.4–1.9)
SODIUM BLD-SCNC: 138 MMOL/L (ref 132–146)
TOTAL IRON BINDING CAPACITY: 231 MCG/DL (ref 250–450)
VITAMIN B-12: 708 PG/ML (ref 211–946)
WBC # BLD: 10 E9/L (ref 4.5–11.5)
WBC # BLD: 10.1 E9/L (ref 4.5–11.5)

## 2021-02-13 PROCEDURE — 6370000000 HC RX 637 (ALT 250 FOR IP): Performed by: INTERNAL MEDICINE

## 2021-02-13 PROCEDURE — 96372 THER/PROPH/DIAG INJ SC/IM: CPT

## 2021-02-13 PROCEDURE — 2580000003 HC RX 258: Performed by: INTERNAL MEDICINE

## 2021-02-13 PROCEDURE — 36415 COLL VENOUS BLD VENIPUNCTURE: CPT

## 2021-02-13 PROCEDURE — 83550 IRON BINDING TEST: CPT

## 2021-02-13 PROCEDURE — 99233 SBSQ HOSP IP/OBS HIGH 50: CPT | Performed by: INTERNAL MEDICINE

## 2021-02-13 PROCEDURE — G0378 HOSPITAL OBSERVATION PER HR: HCPCS

## 2021-02-13 PROCEDURE — 85025 COMPLETE CBC W/AUTO DIFF WBC: CPT

## 2021-02-13 PROCEDURE — 83540 ASSAY OF IRON: CPT

## 2021-02-13 PROCEDURE — 80048 BASIC METABOLIC PNL TOTAL CA: CPT

## 2021-02-13 PROCEDURE — 85027 COMPLETE CBC AUTOMATED: CPT

## 2021-02-13 PROCEDURE — 82962 GLUCOSE BLOOD TEST: CPT

## 2021-02-13 PROCEDURE — 2060000000 HC ICU INTERMEDIATE R&B

## 2021-02-13 PROCEDURE — 82746 ASSAY OF FOLIC ACID SERUM: CPT

## 2021-02-13 PROCEDURE — 6360000002 HC RX W HCPCS: Performed by: INTERNAL MEDICINE

## 2021-02-13 PROCEDURE — 93306 TTE W/DOPPLER COMPLETE: CPT

## 2021-02-13 PROCEDURE — 85045 AUTOMATED RETICULOCYTE COUNT: CPT

## 2021-02-13 PROCEDURE — 82607 VITAMIN B-12: CPT

## 2021-02-13 RX ADMIN — Medication 2000 UNITS: at 06:05

## 2021-02-13 RX ADMIN — LATANOPROST 1 DROP: 50 SOLUTION OPHTHALMIC at 20:16

## 2021-02-13 RX ADMIN — ENOXAPARIN SODIUM 40 MG: 40 INJECTION SUBCUTANEOUS at 08:48

## 2021-02-13 RX ADMIN — TIMOLOL MALEATE 1 DROP: 5 SOLUTION OPHTHALMIC at 08:47

## 2021-02-13 RX ADMIN — BUPROPION HYDROCHLORIDE 75 MG: 75 TABLET, FILM COATED ORAL at 08:47

## 2021-02-13 RX ADMIN — BUPROPION HYDROCHLORIDE 75 MG: 75 TABLET, FILM COATED ORAL at 20:16

## 2021-02-13 RX ADMIN — GLIMEPIRIDE 2 MG: 2 TABLET ORAL at 06:05

## 2021-02-13 RX ADMIN — DOCUSATE SODIUM 100 MG: 100 CAPSULE, LIQUID FILLED ORAL at 08:48

## 2021-02-13 RX ADMIN — SODIUM CHLORIDE: 4.5 INJECTION, SOLUTION INTRAVENOUS at 03:47

## 2021-02-13 RX ADMIN — ASPIRIN 81 MG: 81 TABLET, CHEWABLE ORAL at 08:47

## 2021-02-13 RX ADMIN — SODIUM CHLORIDE, PRESERVATIVE FREE 10 ML: 5 INJECTION INTRAVENOUS at 08:49

## 2021-02-13 RX ADMIN — SODIUM CHLORIDE, PRESERVATIVE FREE 10 ML: 5 INJECTION INTRAVENOUS at 20:16

## 2021-02-13 RX ADMIN — DOCUSATE SODIUM 100 MG: 100 CAPSULE, LIQUID FILLED ORAL at 20:16

## 2021-02-13 RX ADMIN — MIDODRINE HYDROCHLORIDE 5 MG: 5 TABLET ORAL at 08:47

## 2021-02-13 RX ADMIN — MIDODRINE HYDROCHLORIDE 5 MG: 5 TABLET ORAL at 18:57

## 2021-02-13 RX ADMIN — CITALOPRAM 20 MG: 20 TABLET, FILM COATED ORAL at 08:47

## 2021-02-13 RX ADMIN — MIDODRINE HYDROCHLORIDE 5 MG: 5 TABLET ORAL at 12:00

## 2021-02-13 NOTE — PROGRESS NOTES
[facility] Cardiology Inpatient Progress Note    Patient is a 80 y.o. male of Michael Cortés MD seen in hospital follow up. Chief complaint: Syncope    HPI: No CP or SOB.     Patient Active Problem List   Diagnosis    Hyperlipidemia    Depression    B12 deficiency    Type [facility] mellitus [facility] [address]    Orthostasis    Lacunar stroke ([facility] [address]    Elevated diaphragm    Dyspnea    Multiple lung nodules    Cataract    Syncope    Syncope and collapse       No Known Allergies    Current Facility-Administered Medications   Medication Dose Route Frequency Provider Last Rate Last Admin    perflutren lipid microspheres (DEFINITY) injection 1.65 mg  1.5 mL Intravenous ONCE PRN Safia Seat, DO        midodrine (PROAMATINE) tablet 5 mg  5 mg Oral TID WC Safia Seat, DO   5 mg at 02/13/21 0847    buPROPion (WELLBUTRIN) tablet 75 mg  75 mg Oral BID Prudence Jyothi, DO   75 mg at 02/13/21 0847    vitamin D (CHOLECALCIFEROL) tablet 2,000 Units  2,000 Units Oral Daily Prudence Jyothi, DO   2,000 Units at 02/13/21 0324    citalopram (CELEXA) tablet 20 mg  20 mg Oral Daily Prudence Jyothi, DO   20 mg at 02/13/21 0847    docusate sodium (COLACE) capsule 100 mg  100 mg Oral BID Prudence Jyothi, DO   100 mg at 02/13/21 0848    glimepiride (AMARYL) tablet 2 mg  2 mg Oral QAM AC Prudence Jyothi, DO   2 mg at 02/13/21 5638    latanoprost (XALATAN) 0.005 % ophthalmic solution 1 drop  1 drop Right Eye Nightly Prudence Jyothi, DO   1 drop at 02/12/21 2003    simvastatin (ZOCOR) tablet 20 mg  20 mg Oral Nightly Prudence Jyothi, DO        timolol (TIMOPTIC) 0.5 % ophthalmic solution 1 drop  1 drop Right Eye BID Prudence Jyothi, DO   1 drop at 02/13/21 0847    aspirin chewable tablet 81 mg  81 mg Oral Daily Prudence Jyothi, DO   81 mg at 02/13/21 0847    sodium chloride flush 0.9 % injection 10 mL  10 mL Intravenous 2 times per day Prudence Jyothi, DO   10 mL at 02/13/21 0849    sodium chloride flush 0.9 % injection 10 mL  10 mL Intravenous PRN Creed Gary, DO        enoxaparin (LOVENOX) injection 40 mg  40 mg Subcutaneous Daily Creed Gary, DO   40 mg at 02/13/21 0848    promethazine (PHENERGAN) tablet 12.5 mg  12.5 mg Oral Q6H PRN Creed Gary, DO        Or    [facility] injection 4 mg  4 mg Intravenous Q6H PRN Creed Gary, DO        polyethylene glycol (GLYCOLAX) packet 17 g  17 g Oral Daily PRN Creed Gary, DO   17 g at 02/12/21 2003    acetaminophen (TYLENOL) tablet 650 mg  650 mg Oral Q6H PRN Creed Gary, DO        Or    acetaminophen (TYLENOL) suppository 650 mg  650 mg Rectal Q6H PRN Creed Gary, DO        insulin lispro (HUMALOG) injection vial 0-6 Units  0-6 Units Subcutaneous TID WC Creed Gary, DO   1 Units at 02/11/21 1706    insulin lispro (HUMALOG) injection vial 0-3 Units  0-3 Units Subcutaneous Nightly Creed Gary, DO   1 Units at 02/11/21 1952    glucose (GLUTOSE) 40 % oral gel 15 g  15 g Oral PRN Creed Gary, DO        dextrose 50 % IV solution  12.5 g Intravenous PRN Creed Gary, DO        glucagon (rDNA) injection 1 mg  1 mg Intramuscular PRN Creed Gary, DO        dextrose 5 % solution  100 mL/hr Intravenous PRN Creed Gary, DO        0.45 % sodium chloride infusion   Intravenous Continuous Creed Gary, DO 60 mL/hr at 02/13/21 0347 New Bag at 02/13/21 0347       Review of systems:   Heart: as above   Lungs: as above   Eyes: denies changes in vision or discharge. Ears: denies changes in hearing or pain. Nose: denies epistaxis or masses   Throat: denies sore throat or trouble swallowing. Neuro: denies numbness, tingling, tremors. Skin: denies rashes or itching. : denies hematuria, dysuria   GI: denies vomiting, diarrhea   Psych: denies mood changed, anxiety, depression.     Physical Exam   /70   Pulse 87   Temp 98.4 °F (36.9 °C) (Temporal)   Resp 19   Ht 6' (1.829 m)   Wt 175 lb (79.4 kg)   SpO2 93%   BMI 23.73 kg/m² Constitutional: Oriented to person, place, and time. No distress. Well developed. Head: Normocephalic and atraumatic. Neck: Neck supple. No hepatojugular reflux. No JVD present. Carotid bruit is not present. No tracheal deviation present. No thyromegaly present. Cardiovascular: Normal rate, regular rhythm, normal heart sounds. intact distal pulses. No gallop and no friction rub. No murmur heard. Pulmonary: Breath sounds normal. No respiratory distress. No wheezes. No rales. Chest: Effort normal. No tenderness. Abdominal: Soft. Bowel sounds are normal. No distension or mass. No tenderness, rebound or guarding. Musculoskeletal: . No tenderness. No clubbing or cyanosis. Extremitites: Intact distal pulses. No edema  Neurological: Alert and oriented to person, place, and time. Skin: Skin is warm and dry. No rash noted. Not diaphoretic. No erythema. Psychiatric: Normal mood and affect. Behavior is normal.   Lymphadenopathy: No cervical adenopathy. No groin adenopathy. CBC:   Lab Results   Component Value Date    WBC 10.0 02/13/2021    RBC 3.38 02/13/2021    HGB 10.1 02/13/2021    HCT 32.1 02/13/2021    MCV 95.0 02/13/2021    MCH 29.9 02/13/2021    MCHC 31.5 02/13/2021    RDW 13.2 02/13/2021     02/13/2021    MPV 11.5 02/13/2021     BMP:   Lab Results   Component Value Date     02/13/2021    K 4.2 02/13/2021     02/13/2021    CO2 27 02/13/2021    BUN 16 02/13/2021    LABALBU 3.6 02/11/2021    CREATININE 1.0 02/13/2021    CALCIUM 8.7 02/13/2021    GFRAA >60 02/13/2021    LABGLOM >60 02/13/2021     Magnesium:  No results found for: MG  Cardiac Enzymes:   Lab Results   Component Value Date    TROPONINI <0.01 02/11/2021    TROPONINI <0.01 07/22/2016      PT/INR:  No results found for: PROTIME, INR  TSH:  No results found for: TSH    Rhythm Strip: normal sinus rhythm. Echo Summary 2/13/2021:   Technically difficult study. Ejection fraction is visually estimated at 65%.    No

## 2021-02-13 NOTE — PROGRESS NOTES
PT SEEN AND EXAMINED. He is alert. No acute distress. Cardiology consult noted. Chart reviewed. Somewhat anemic  Chart reviewed. meds reviewed. D/w nursing + family as available. EXAM: IN GENERAL, NAD. AWAKE AND ALERT. ROS NEGx10 EXCEPT:   /70   Pulse 87   Temp 98.4 °F (36.9 °C) (Temporal)   Resp 19   Ht 6' (1.829 m)   Wt 175 lb (79.4 kg)   SpO2 93%   BMI 23.73 kg/m²   GEN: A+O NAD. HEENT: NCAT. EOMI. ANUPAM  NECK: NO JVD. TRACH MIDLINE. NO BRUITS. NO THYROMEGALY. LUNGS: CTA BL NO RALES, RHONCHI OR WHEEZES. GOOD EXCURSION. CV: Regular rate and rhythm, NO Murmurs, Rubs, Or gallops  ABD: Soft. Nontender. Normal bowel sounds. No organomegaly  EXT:No clubbing cyanosis or edema  Neuro: Alert and oriented x 3. No focal motor deficits. No sensory deficits. Reflexes appear intact.   Labs/data reviewedLABS: CBC with Differential:    Lab Results   Component Value Date    WBC 10.1 02/13/2021    RBC 3.48 02/13/2021    HGB 10.7 02/13/2021    HCT 33.1 02/13/2021     02/13/2021    MCV 95.1 02/13/2021    MCH 30.7 02/13/2021    MCHC 32.3 02/13/2021    RDW 13.2 02/13/2021    LYMPHOPCT 10.6 02/12/2021    MONOPCT 8.8 02/12/2021    BASOPCT 0.2 02/12/2021    MONOSABS 1.09 02/12/2021    LYMPHSABS 1.31 02/12/2021    EOSABS 0.01 02/12/2021    BASOSABS 0.03 02/12/2021     Platelets:    Lab Results   Component Value Date     02/13/2021     CMP:    Lab Results   Component Value Date     02/13/2021    K 4.2 02/13/2021     02/13/2021    CO2 27 02/13/2021    BUN 16 02/13/2021    CREATININE 1.0 02/13/2021    GFRAA >60 02/13/2021    LABGLOM >60 02/13/2021    GLUCOSE 115 02/13/2021    PROT 5.7 02/11/2021    LABALBU 3.6 02/11/2021    CALCIUM 8.7 02/13/2021    BILITOT 0.4 02/11/2021    ALKPHOS 102 02/11/2021    AST 18 02/11/2021    ALT 21 02/11/2021     Magnesium:  No results found for: MG  LDH:  No results found for: LDH  PT/INR:  No results found for: PROTIME, INR  Last 3 Troponin:    Lab Results   Component Value Date    TROPONINI <0.01 02/11/2021    TROPONINI <0.01 07/22/2016     ABG:  No results found for: PH, PCO2, PO2, HCO3, BE, THGB, TCO2, O2SAT  IRON:  No results found for: IRON  IMAGING    Ct Head Wo Contrast    Result Date: 2/11/2021  EXAMINATION: CT OF THE HEAD WITHOUT CONTRAST  2/11/2021 10:44 am TECHNIQUE: CT of the head was performed without the administration of intravenous contrast. Dose modulation, iterative reconstruction, and/or weight based adjustment of the mA/kV was utilized to reduce the radiation dose to as low as reasonably achievable. COMPARISON: 07/22/2016 HISTORY: ORDERING SYSTEM PROVIDED HISTORY: Trauma TECHNOLOGIST PROVIDED HISTORY: Has a \"code stroke\" or \"stroke alert\" been called? ->No Reason for exam:->Trauma What reading provider will be dictating this exam?->CRC FINDINGS: BRAIN/VENTRICLES: There is no acute intracranial hemorrhage, mass effect or midline shift. No abnormal extra-axial fluid collection. The gray-white differentiation is maintained without evidence of an acute infarct. There is no evidence of hydrocephalus. The ventricles, cisterns and sulci are prominent consistent with atrophy. There is decreased attenuation within the periventricular white matter consistent with periventricular leukomalacia. ORBITS: The visualized portion of the orbits demonstrate no acute abnormality. SINUSES: The visualized paranasal sinuses and mastoid air cells demonstrate no acute abnormality. SOFT TISSUES/SKULL:  No acute abnormality of the visualized skull or soft tissues. There is soft tissue thickening seen within the right external auditory canal.    1. There is no acute intracranial abnormality. Specifically, there is no intracranial hemorrhage.  2. Atrophy and periventricular leukomalacia,    Ct Cervical Spine Wo Contrast    Result Date: 2/11/2021  EXAMINATION: CT OF THE CERVICAL SPINE WITHOUT CONTRAST 2/11/2021 10:44 am TECHNIQUE: CT of the cervical spine was performed without the administration of intravenous contrast. Multiplanar reformatted images are provided for review. Dose modulation, iterative reconstruction, and/or weight based adjustment of the mA/kV was utilized to reduce the radiation dose to as low as reasonably achievable. COMPARISON: 07/22/2016 HISTORY: ORDERING SYSTEM PROVIDED HISTORY: Trauma TECHNOLOGIST PROVIDED HISTORY: Reason for exam:->Trauma What reading provider will be dictating this exam?->CRC Patient fell at home down stairs with short loss of consciousness and amnesia. Forehead laceration. 2nd fall this morning according to EMS. FINDINGS: Prevertebral soft tissues are without swelling. No evidence of cervical fracture or vertebral compression. Multilevel degenerative changes are present at the vertebral end plates, facet joints, and uncinate joints. Degenerative changes are associated with multilevel slight disc narrowing and neural foraminal stenosis. Bilateral tonsillar calcification may be from old infection. Atherosclerotic calcified plaque in the carotid arteries bilaterally. Nonspecific scattered opacity and mucosal thickening in right mastoid air cells. Slight asymmetric fullness of right torus tubarius appears unchanged from 2016. This is likely developmental variation. No acute skeletal pathology in the cervical spine. Multilevel degenerative change Partial opacification of mastoid air cells may reflect eustachian tube dysfunction. Differential includes right mastoiditis. Xr Chest Portable    Result Date: 2/11/2021  EXAMINATION: ONE XRAY VIEW OF THE CHEST 2/11/2021 9:52 am COMPARISON: None. HISTORY: ORDERING SYSTEM PROVIDED HISTORY: Syncope, fall TECHNOLOGIST PROVIDED HISTORY: Reason for exam:->Syncope, fall What reading provider will be dictating this exam?->CRC FINDINGS: There is significant elevation of the left hemidiaphragm. Right lung is clear. There are no effusions. There are no obvious fractures.     Significant elevation left hemidiaphragm      DIET:  DIET GENERAL;    Medications:    Scheduled Meds:   midodrine  5 mg Oral TID WC    buPROPion  75 mg Oral BID    vitamin D  2,000 Units Oral Daily    citalopram  20 mg Oral Daily    docusate sodium  100 mg Oral BID    glimepiride  2 mg Oral QAM AC    latanoprost  1 drop Right Eye Nightly    simvastatin  20 mg Oral Nightly    timolol  1 drop Right Eye BID    aspirin  81 mg Oral Daily    sodium chloride flush  10 mL Intravenous 2 times per day    enoxaparin  40 mg Subcutaneous Daily    insulin lispro  0-6 Units Subcutaneous TID WC    insulin lispro  0-3 Units Subcutaneous Nightly       Continuous Infusions:   dextrose      sodium chloride 60 mL/hr at 02/13/21 0347       PRN Meds:perflutren lipid microspheres, sodium chloride flush, promethazine **OR** ondansetron, polyethylene glycol, acetaminophen **OR** acetaminophen, glucose, dextrose, glucagon (rDNA), dextrose    A/P:      Patient Active Problem List   Diagnosis    Hyperlipidemia    Depression    B12 deficiency    Type 2 diabetes mellitus (HCC)    Orthostasis    Lacunar stroke (HCC)    Elevated diaphragm    Dyspnea    Multiple lung nodules    Cataract    Syncope    Anemia     PLAN:  Will wait for echo results. Increase activity  Will work up anemia  Cont midodrine  ?timolol contributing to Orthostasis?   I can be reached though [employer] [facility] or [facility] [place] at [phone]

## 2021-02-14 ENCOUNTER — APPOINTMENT (OUTPATIENT)
Dept: GENERAL RADIOLOGY | Age: 86
DRG: 312 | End: 2021-02-14
Payer: MEDICARE

## 2021-02-14 LAB
METER GLUCOSE: 113 MG/DL (ref 74–99)
METER GLUCOSE: 53 MG/DL (ref 74–99)
METER GLUCOSE: 62 MG/DL (ref 74–99)
METER GLUCOSE: 65 MG/DL (ref 74–99)
METER GLUCOSE: 67 MG/DL (ref 74–99)
METER GLUCOSE: 72 MG/DL (ref 74–99)
METER GLUCOSE: 80 MG/DL (ref 74–99)
METER GLUCOSE: 85 MG/DL (ref 74–99)
METER GLUCOSE: 97 MG/DL (ref 74–99)

## 2021-02-14 PROCEDURE — 6360000002 HC RX W HCPCS: Performed by: INTERNAL MEDICINE

## 2021-02-14 PROCEDURE — 6370000000 HC RX 637 (ALT 250 FOR IP): Performed by: INTERNAL MEDICINE

## 2021-02-14 PROCEDURE — 71045 X-RAY EXAM CHEST 1 VIEW: CPT

## 2021-02-14 PROCEDURE — 2060000000 HC ICU INTERMEDIATE R&B

## 2021-02-14 PROCEDURE — 82962 GLUCOSE BLOOD TEST: CPT

## 2021-02-14 PROCEDURE — 2700000000 HC OXYGEN THERAPY PER DAY

## 2021-02-14 PROCEDURE — 99232 SBSQ HOSP IP/OBS MODERATE 35: CPT | Performed by: INTERNAL MEDICINE

## 2021-02-14 PROCEDURE — 2580000003 HC RX 258: Performed by: INTERNAL MEDICINE

## 2021-02-14 RX ADMIN — SODIUM CHLORIDE, PRESERVATIVE FREE 10 ML: 5 INJECTION INTRAVENOUS at 20:21

## 2021-02-14 RX ADMIN — SODIUM CHLORIDE, PRESERVATIVE FREE 10 ML: 5 INJECTION INTRAVENOUS at 09:08

## 2021-02-14 RX ADMIN — ASPIRIN 81 MG: 81 TABLET, CHEWABLE ORAL at 09:07

## 2021-02-14 RX ADMIN — LATANOPROST 1 DROP: 50 SOLUTION OPHTHALMIC at 20:20

## 2021-02-14 RX ADMIN — MIDODRINE HYDROCHLORIDE 5 MG: 5 TABLET ORAL at 13:05

## 2021-02-14 RX ADMIN — ENOXAPARIN SODIUM 40 MG: 40 INJECTION SUBCUTANEOUS at 09:07

## 2021-02-14 RX ADMIN — BUPROPION HYDROCHLORIDE 75 MG: 75 TABLET, FILM COATED ORAL at 09:07

## 2021-02-14 RX ADMIN — DOCUSATE SODIUM 100 MG: 100 CAPSULE, LIQUID FILLED ORAL at 20:20

## 2021-02-14 RX ADMIN — MIDODRINE HYDROCHLORIDE 5 MG: 5 TABLET ORAL at 16:29

## 2021-02-14 RX ADMIN — CITALOPRAM 20 MG: 20 TABLET, FILM COATED ORAL at 09:14

## 2021-02-14 RX ADMIN — Medication 2000 UNITS: at 05:39

## 2021-02-14 RX ADMIN — GLIMEPIRIDE 2 MG: 2 TABLET ORAL at 05:39

## 2021-02-14 RX ADMIN — TIMOLOL MALEATE 1 DROP: 5 SOLUTION OPHTHALMIC at 10:29

## 2021-02-14 RX ADMIN — TIMOLOL MALEATE 1 DROP: 5 SOLUTION OPHTHALMIC at 20:20

## 2021-02-14 RX ADMIN — BUPROPION HYDROCHLORIDE 75 MG: 75 TABLET, FILM COATED ORAL at 20:20

## 2021-02-14 RX ADMIN — MIDODRINE HYDROCHLORIDE 5 MG: 5 TABLET ORAL at 09:07

## 2021-02-14 RX ADMIN — Medication 15 G: at 20:19

## 2021-02-14 RX ADMIN — DOCUSATE SODIUM 100 MG: 100 CAPSULE, LIQUID FILLED ORAL at 09:07

## 2021-02-14 ASSESSMENT — PAIN DESCRIPTION - PAIN TYPE: TYPE: CHRONIC PAIN

## 2021-02-14 ASSESSMENT — PAIN SCALES - GENERAL
PAINLEVEL_OUTOF10: 0
PAINLEVEL_OUTOF10: 0

## 2021-02-14 NOTE — PROGRESS NOTES
Component Value Date    TROPONINI <0.01 02/11/2021    TROPONINI <0.01 07/22/2016     ABG:  No results found for: PH, PCO2, PO2, HCO3, BE, THGB, TCO2, O2SAT  IRON:    Lab Results   Component Value Date    IRON 55 02/13/2021     IMAGING    Ct Head Wo Contrast    Result Date: 2/11/2021  EXAMINATION: CT OF THE HEAD WITHOUT CONTRAST  2/11/2021 10:44 am TECHNIQUE: CT of the head was performed without the administration of intravenous contrast. Dose modulation, iterative reconstruction, and/or weight based adjustment of the mA/kV was utilized to reduce the radiation dose to as low as reasonably achievable. COMPARISON: 07/22/2016 HISTORY: ORDERING SYSTEM PROVIDED HISTORY: Trauma TECHNOLOGIST PROVIDED HISTORY: Has a \"code stroke\" or \"stroke alert\" been called? ->No Reason for exam:->Trauma What reading provider will be dictating this exam?->CRC FINDINGS: BRAIN/VENTRICLES: There is no acute intracranial hemorrhage, mass effect or midline shift. No abnormal extra-axial fluid collection. The gray-white differentiation is maintained without evidence of an acute infarct. There is no evidence of hydrocephalus. The ventricles, cisterns and sulci are prominent consistent with atrophy. There is decreased attenuation within the periventricular white matter consistent with periventricular leukomalacia. ORBITS: The visualized portion of the orbits demonstrate no acute abnormality. SINUSES: The visualized paranasal sinuses and mastoid air cells demonstrate no acute abnormality. SOFT TISSUES/SKULL:  No acute abnormality of the visualized skull or soft tissues. There is soft tissue thickening seen within the right external auditory canal.    1. There is no acute intracranial abnormality. Specifically, there is no intracranial hemorrhage.  2. Atrophy and periventricular leukomalacia,    Ct Cervical Spine Wo Contrast    Result Date: 2/11/2021  EXAMINATION: CT OF THE CERVICAL SPINE WITHOUT CONTRAST 2/11/2021 10:44 am TECHNIQUE: CT of fractures. Significant elevation left hemidiaphragm      DIET:  DIET GENERAL;    Medications:    Scheduled Meds:   midodrine  5 mg Oral TID WC    buPROPion  75 mg Oral BID    vitamin D  2,000 Units Oral Daily    citalopram  20 mg Oral Daily    docusate sodium  100 mg Oral BID    glimepiride  2 mg Oral QAM AC    latanoprost  1 drop Right Eye Nightly    simvastatin  20 mg Oral Nightly    timolol  1 drop Right Eye BID    aspirin  81 mg Oral Daily    sodium chloride flush  10 mL Intravenous 2 times per day    enoxaparin  40 mg Subcutaneous Daily    insulin lispro  0-6 Units Subcutaneous TID WC    insulin lispro  0-3 Units Subcutaneous Nightly       Continuous Infusions:   dextrose      sodium chloride 60 mL/hr at 02/13/21 0347       PRN Meds:perflutren lipid microspheres, sodium chloride flush, promethazine **OR** ondansetron, polyethylene glycol, acetaminophen **OR** acetaminophen, glucose, dextrose, glucagon (rDNA), dextrose    A/P:      Patient Active Problem List   Diagnosis    Hyperlipidemia    Depression    B12 deficiency    Type 2 diabetes mellitus (HCC)    Orthostasis    Lacunar stroke (HCC)    Elevated diaphragm    Dyspnea    Multiple lung nodules    Cataract    Syncope    Syncope and collapse    Anemia     PLAN:  Will get chest x-ray  breathing treatments  PT/OT evaluation for SHARON  Increase activity  Cont midodrine  ?timolol contributing to Orthostasis?   I can be reached though [employer] or [facility] at [phone]

## 2021-02-14 NOTE — PROGRESS NOTES
[facility] Cardiology Inpatient Progress Note    Patient is a 80 y.o. male of Rachana Mina MD seen in hospital follow up. Chief complaint: Syncope    HPI: No CP or SOB.     Patient Active Problem List   Diagnosis    Hyperlipidemia    Depression    B12 deficiency    Type 2 diabetes mellitus ([facility] [address]    Orthostasis    Lacunar stroke ([facility] [address]    Elevated diaphragm    Dyspnea    Multiple lung nodules    Cataract    Syncope    Syncope and collapse       No Known Allergies    Current Facility-Administered Medications   Medication Dose Route Frequency Provider Last Rate Last Admin    perflutren lipid microspheres (DEFINITY) injection 1.65 mg  1.5 mL Intravenous ONCE PRN Junius Helms, DO        midodrine (PROAMATINE) tablet 5 mg  5 mg Oral TID  Junius Helms, DO   5 mg at 02/14/21 0907    buPROPion (WELLBUTRIN) tablet 75 mg  75 mg Oral BID Cyndia Darryle, DO   75 mg at 02/14/21 0834    vitamin D (CHOLECALCIFEROL) tablet 2,000 Units  2,000 Units Oral Daily Zoraa Darryle, DO   2,000 Units at 02/14/21 0539    citalopram (CELEXA) tablet 20 mg  20 mg Oral Daily Zoraa Robe, DO   20 mg at 02/14/21 0914    docusate sodium (COLACE) capsule 100 mg  100 mg Oral BID Mariedia Darryle, DO   100 mg at 02/14/21 0155    glimepiride (AMARYL) tablet 2 mg  2 mg Oral QAM AC Cyndia Robe, DO   2 mg at 02/14/21 0539    latanoprost (XALATAN) 0.005 % ophthalmic solution 1 drop  1 drop Right Eye Nightly Zoraa Chuy, DO   1 drop at 02/13/21 2016    simvastatin (ZOCOR) tablet 20 mg  20 mg Oral Nightly Cyndia Robe, DO        timolol (TIMOPTIC) 0.5 % ophthalmic solution 1 drop  1 drop Right Eye BID Zoraa Chuy, DO   Stopped at 02/13/21 2203    aspirin chewable tablet 81 mg  81 mg Oral Daily Cyndia Robe, DO   81 mg at 02/14/21 8762    sodium chloride flush 0.9 % injection 10 mL  10 mL Intravenous 2 times per day Joe Vera DO   10 mL at 02/14/21 0908    sodium chloride flush 0.9 % injection 10 mL 10 mL Intravenous PRN Rudi Anderson, DO        enoxaparin (LOVENOX) injection 40 mg  40 mg Subcutaneous Daily Rudi Anderson, DO   40 mg at 02/14/21 0431    promethazine (PHENERGAN) tablet 12.5 mg  12.5 mg Oral Q6H PRN Rudi Anderson, DO        Or    ondansetron [facility]) injection 4 mg  4 mg Intravenous Q6H PRN Rudi Anderson, DO        polyethylene glycol (GLYCOLAX) packet 17 g  17 g Oral Daily PRN Rudi Anderson, DO   17 g at 02/12/21 2003    acetaminophen (TYLENOL) tablet 650 mg  650 mg Oral Q6H PRN Rudi Anderson, DO        Or    acetaminophen (TYLENOL) suppository 650 mg  650 mg Rectal Q6H PRN Rudi Anderson, DO        insulin lispro (HUMALOG) injection vial 0-6 Units  0-6 Units Subcutaneous TID WC Rudi Anderson, DO   1 Units at 02/11/21 1706    insulin lispro (HUMALOG) injection vial 0-3 Units  0-3 Units Subcutaneous Nightly Rudi Anderson, DO   Stopped at 02/13/21 2015    glucose (GLUTOSE) 40 % oral gel 15 g  15 g Oral PRN Rudi Anderson, DO        dextrose 50 % IV solution  12.5 g Intravenous PRN Rudi Anderson, DO        glucagon (rDNA) injection 1 mg  1 mg Intramuscular PRN Rudi Anderson, DO        dextrose 5 % solution  100 mL/hr Intravenous PRN Rudi Anderson, DO        0.45 % sodium chloride infusion   Intravenous Continuous Rudi Anderson, DO 60 mL/hr at 02/13/21 0347 New Bag at 02/13/21 0347       Review of systems:   Heart: as above   Lungs: as above   Eyes: denies changes in vision or discharge. Ears: denies changes in hearing or pain. Nose: denies epistaxis or masses   Throat: denies sore throat or trouble swallowing. Neuro: denies numbness, tingling, tremors. Skin: denies rashes or itching. : denies hematuria, dysuria   GI: denies vomiting, diarrhea   Psych: denies mood changed, anxiety, depression.     Physical Exam   /71   Pulse 91   Temp 97.6 °F (36.4 °C) (Oral)   Resp 27   Ht 6' (1.829 m)   Wt 175 lb (79.4 kg)   SpO2 97%   BMI 23.73 kg/m² Constitutional: Oriented to person, place, and time. No distress. Well developed. Head: Normocephalic and atraumatic. Neck: Neck supple. No hepatojugular reflux. No JVD present. Carotid bruit is not present. No tracheal deviation present. No thyromegaly present. Cardiovascular: Normal rate, regular rhythm, normal heart sounds. intact distal pulses. No gallop and no friction rub. No murmur heard. Pulmonary: Breath sounds normal. No respiratory distress. No wheezes. No rales. Chest: Effort normal. No tenderness. Abdominal: Soft. Bowel sounds are normal. No distension or mass. No tenderness, rebound or guarding. Musculoskeletal: . No tenderness. No clubbing or cyanosis. Extremitites: Intact distal pulses. No edema  Neurological: Alert and oriented to person, place, and time. Skin: Skin is warm and dry. No rash noted. Not diaphoretic. No erythema. Psychiatric: Normal mood and affect. Behavior is normal.   Lymphadenopathy: No cervical adenopathy. No groin adenopathy. CBC:   Lab Results   Component Value Date    WBC 10.0 02/13/2021    RBC 3.38 02/13/2021    HGB 10.1 02/13/2021    HCT 32.1 02/13/2021    MCV 95.0 02/13/2021    MCH 29.9 02/13/2021    MCHC 31.5 02/13/2021    RDW 13.2 02/13/2021     02/13/2021    MPV 11.5 02/13/2021     BMP:   Lab Results   Component Value Date     02/13/2021    K 4.2 02/13/2021     02/13/2021    CO2 27 02/13/2021    BUN 16 02/13/2021    LABALBU 3.6 02/11/2021    CREATININE 1.0 02/13/2021    CALCIUM 8.7 02/13/2021    GFRAA >60 02/13/2021    LABGLOM >60 02/13/2021     Magnesium:  No results found for: MG  Cardiac Enzymes:   Lab Results   Component Value Date    TROPONINI <0.01 02/11/2021    TROPONINI <0.01 07/22/2016      PT/INR:  No results found for: PROTIME, INR  TSH:  No results found for: TSH    Rhythm Strip: normal sinus rhythm. Echo Summary 2/13/2021:   Technically difficult study. Ejection fraction is visually estimated at 65%.    No regional wall motion abnormalities seen. Normal right ventricle structure and function. Physiologic and/or trace mitral regurgitation is present. ASSESSMENT & PLAN:    Patient Active Problem List   Diagnosis    Hyperlipidemia    Depression    B12 deficiency    Type 2 diabetes mellitus ([facility] [address]    Orthostasis    Lacunar stroke ([facility] [address]    Elevated diaphragm    Dyspnea    Multiple lung nodules    Cataract    Syncope    Syncope and collapse     1. Syncope/Fall: EKG normal.     Orthos positive. Restart midodrine. Encourage PO intake. Monitor. Echo okay. Okay to move to discharge. Will arrange 30 day outpatient monitor. 2. Hypotension issues: Takes midodrine. 3. Lipids: Statin. 4. DM: Per primary service. Patient stable from CV standpoint. Please call if needed. TY. Tameka English D.O.   Cardiologist  Cardiology, [facility]

## 2021-02-14 NOTE — PROGRESS NOTES
Patient was found to have increasing wheezes and 87% on RA. 2L O2 applied at this time. Oxygen saturation at 97% on 2L.

## 2021-02-15 VITALS
SYSTOLIC BLOOD PRESSURE: 97 MMHG | DIASTOLIC BLOOD PRESSURE: 51 MMHG | RESPIRATION RATE: 18 BRPM | WEIGHT: 175 LBS | OXYGEN SATURATION: 92 % | HEART RATE: 82 BPM | BODY MASS INDEX: 23.7 KG/M2 | HEIGHT: 72 IN | TEMPERATURE: 97.3 F

## 2021-02-15 PROBLEM — R55 SYNCOPE: Status: RESOLVED | Noted: 2021-02-11 | Resolved: 2021-02-15

## 2021-02-15 LAB
METER GLUCOSE: 100 MG/DL (ref 74–99)
METER GLUCOSE: 150 MG/DL (ref 74–99)
METER GLUCOSE: 69 MG/DL (ref 74–99)
METER GLUCOSE: 79 MG/DL (ref 74–99)
METER GLUCOSE: 91 MG/DL (ref 74–99)
PATHOLOGIST REVIEW: NORMAL
SARS-COV-2, NAAT: NOT DETECTED

## 2021-02-15 PROCEDURE — 97166 OT EVAL MOD COMPLEX 45 MIN: CPT

## 2021-02-15 PROCEDURE — 82962 GLUCOSE BLOOD TEST: CPT

## 2021-02-15 PROCEDURE — 2580000003 HC RX 258: Performed by: INTERNAL MEDICINE

## 2021-02-15 PROCEDURE — 6360000002 HC RX W HCPCS: Performed by: INTERNAL MEDICINE

## 2021-02-15 PROCEDURE — 97530 THERAPEUTIC ACTIVITIES: CPT | Performed by: PHYSICAL THERAPIST

## 2021-02-15 PROCEDURE — U0002 COVID-19 LAB TEST NON-CDC: HCPCS

## 2021-02-15 PROCEDURE — 97162 PT EVAL MOD COMPLEX 30 MIN: CPT | Performed by: PHYSICAL THERAPIST

## 2021-02-15 PROCEDURE — 6370000000 HC RX 637 (ALT 250 FOR IP): Performed by: INTERNAL MEDICINE

## 2021-02-15 PROCEDURE — 97530 THERAPEUTIC ACTIVITIES: CPT

## 2021-02-15 RX ORDER — MIDODRINE HYDROCHLORIDE 5 MG/1
5 TABLET ORAL
Qty: 90 TABLET | Refills: 3 | DISCHARGE
Start: 2021-02-15 | End: 2022-06-20

## 2021-02-15 RX ADMIN — SODIUM CHLORIDE, PRESERVATIVE FREE 10 ML: 5 INJECTION INTRAVENOUS at 09:03

## 2021-02-15 RX ADMIN — MIDODRINE HYDROCHLORIDE 5 MG: 5 TABLET ORAL at 12:06

## 2021-02-15 RX ADMIN — Medication 2000 UNITS: at 05:48

## 2021-02-15 RX ADMIN — ENOXAPARIN SODIUM 40 MG: 40 INJECTION SUBCUTANEOUS at 09:02

## 2021-02-15 RX ADMIN — DOCUSATE SODIUM 100 MG: 100 CAPSULE, LIQUID FILLED ORAL at 09:03

## 2021-02-15 RX ADMIN — GLIMEPIRIDE 2 MG: 2 TABLET ORAL at 05:48

## 2021-02-15 RX ADMIN — CITALOPRAM 20 MG: 20 TABLET, FILM COATED ORAL at 09:03

## 2021-02-15 RX ADMIN — DEXTROSE MONOHYDRATE 12.5 G: 25 INJECTION, SOLUTION INTRAVENOUS at 05:46

## 2021-02-15 RX ADMIN — TIMOLOL MALEATE 1 DROP: 5 SOLUTION OPHTHALMIC at 09:03

## 2021-02-15 RX ADMIN — MIDODRINE HYDROCHLORIDE 5 MG: 5 TABLET ORAL at 09:03

## 2021-02-15 RX ADMIN — BUPROPION HYDROCHLORIDE 75 MG: 75 TABLET, FILM COATED ORAL at 09:03

## 2021-02-15 RX ADMIN — ASPIRIN 81 MG: 81 TABLET, CHEWABLE ORAL at 09:03

## 2021-02-15 ASSESSMENT — PAIN SCALES - GENERAL: PAINLEVEL_OUTOF10: 0

## 2021-02-15 NOTE — PROGRESS NOTES
Subjective: The patient is awake and alert. No problems overnight. Denies chest pain, angina, and dyspnea. Denies abdominal pain. Tolerating diet. No nausea or vomiting. Objective:  Pt is resting in nad  /65   Pulse 71   Temp 98 °F (36.7 °C) (Temporal)   Resp 17   Ht 6' (1.829 m)   Wt 175 lb (79.4 kg)   SpO2 97%   BMI 23.73 kg/m²   HEENT no adenopathy no bruits  Heart:  RRR, no murmurs, gallops, or rubs. Lungs:  CTA bilaterally, no wheeze, rales or rhonchi  Abd: bowel sounds present, nontender, nondistended, no masses  Extrem:  No clubbing, cyanosis, or edema  WBC/Hgb/Hct/Plts:  10.0/10.1/32.1/151 (02/13 3426) basic metabolic panel     Assessment:    Patient Active Problem List   Diagnosis    Hyperlipidemia    Depression    B12 deficiency    Type 2 diabetes mellitus (HCC)    Orthostasis    Lacunar stroke (HCC)    Elevated diaphragm    Dyspnea    Multiple lung nodules    Cataract    Syncope    Syncope and collapse       Plan:     We will see how he does with PT, if he does ok, will discharge to home today       Leticia Palomo  7:05 AM  2/15/2021

## 2021-02-15 NOTE — PROGRESS NOTES
Physician Progress Note      PATIENT:               Lulu Rodriguez  CSN #:                  [id number]  :                       10/9/1927  ADMIT DATE:       2021 8:56 AM  DISCH DATE:        2/15/2021 3:40 PM  RESPONDING  PROVIDER #:        Jennifer Arroyo MD          QUERY TEXT:    Dear Dr Yelitza Chairez and associates,    Patient admitted with syncope and collapse. noted to have orthostatic   hypotension. If possible, please document in progress notes and discharge   summary if you are evaluating and/or treating any of the following: The medical record reflects the following:  Risk Factors: age DM HTN ADD  Clinical Indicators: +orthostatic Bp 111/65 sitting 74/95 standing on adm  Treatment: IVF Proamatine 5mg 3 x per day orthostatic Bp Hbg 9.3 on adm    Sandra Reeves RN BSN CCDS  Options provided:  -- Syncope due to orthostatic hypotension secondary to ###(please specify), #   (please specify).   -- Syncope due to other hypotension  -- Other - I will add my own diagnosis  -- Disagree - Not applicable / Not valid  -- Disagree - Clinically unable to determine / Unknown  -- Refer to Clinical Documentation Reviewer    PROVIDER RESPONSE TEXT:    The syncope is due to orthostatic hypotension secondary to    Query created by: Sharon Tinoco on 2/15/2021 11:14 AM      Electronically signed by:  Jennifer Arroyo MD 2/15/2021 4:10 PM

## 2021-02-15 NOTE — CARE COORDINATION
Spoke with patient's daughter in law, [facility] at bedside. She does not feel patient will be safe at home with spouse. We discussed options choiced for 1. [place], no beds, 2. [address] referral pending, 3. [facility]. Will need COVID test. [facility] and ambulance on soft chart. [address] accepted. Bed available when stable for discharge. [address] Patient for discharge. Physicians outsourced to [facility] for  . Daughter in law kayleigh notified of discharge time. Facility notified of discharge time. For questions I can be reached at [phone]. [place], [place]    The Plan for Transition of Care is related to the following treatment goals: discharge planning when stable     The Patient and/or patient representative Holabird Riding was provided with a choice of provider and agrees   with the discharge plan. [x] Yes [] No    Freedom of choice list was provided with basic dialogue that supports the patient's individualized plan of care/goals, treatment preferences and shares the quality data associated with the providers.  [x] Yes [] No

## 2021-02-15 NOTE — PROGRESS NOTES
OCCUPATIONAL THERAPY INITIAL EVALUATION      Date:2/15/2021  Patient Name: Ami Elliott  MRN: [id number]  : 10/9/1927  Room: [address] [place] [address] St, OTR/L #[id number]    AM-PAC Daily Activity Raw Score:   Recommended Adaptive Equipment: TBD     Diagnosis: Syncope and collapse [R55]     Referring physician: Allie José MD    Pertinent Medical History: ADD, anxiety, depression, DM, HTN    Precautions:  Falls, cognition, bed alarm     Home Living: Pt lives with spouse in 1 floor home. 2 WALKER, 1 handrail  Bathroom setup: walk in shower with shower chair and handheld showerhead   Equipment owned: w/w    Prior Level of Function: independent/mod I with ADLs , assistance with IADLs (family assist w/ basement laundry, meals and home management); ambulated w/o AD (furniture/wall walker)  Driving: no - son or daughter in law assist  Occupation: retired  Daughter in law present - provided [employer]. DIL also indicated recent decline in function and pt refuses to utilize w/w for ambulation, frequent falls    Pain Level: Pt c/o no pain this session    Cognition: A&O: 2/4 (self and place); Follows 1 step directions inconsistently. Increased difficulty following commands and max cues during lateral sidesteps to [facility] and during standing tasks.    Memory:  fair -   Sequencing:  fair -   Problem solving:  poor   Judgement/safety:  poor     Functional Assessment:   Initial Eval Status  Date: 2/15/21 Treatment Status  Date: Short Term Goals/LTG  Treatment frequency: 1-4x/wk   Feeding Stand by Assist   Modified [place]    Grooming Minimal Assist  Seated EOB   Modified [place]    UB Dressing Moderate Assist   Stand by Assist    LB Dressing Dependent   Moderate Assist    Bathing Maximal Assist  Minimal Assist    Toileting Dependent  Simulated hygiene  Improved w/ utilizing urinal while seated EOB  Moderate Assist    Bed Mobility  Supine to sit: Minimal Assist   Sit to supine: Minimal Assist   Rolling: Modified [place]   Supine to sit: Supervision   Sit to supine: Supervision    Functional Transfers Mod A  SBA   Functional Mobility Max A x2 w/ w/w  Lateral sidesteps to [facility]  Poor safety, increased difficulty following commands especially for LE placement. Assist required  Min A   Balance Sitting: Min A  Standing: Mod A w/ w/w (static)  Max A x2 w/ w/w (dynamic)     Activity Tolerance Fair-  Fair+   Visual/  Perceptual Glasses: no                Hand dominance: R   Strength ROM Additional Info:    RUE  4-/5  WFL   good  and wfl FMC/dexterity noted during ADL tasks       LUE 4-/5  WFL   good  and wfl FMC/dexterity noted during ADL tasks       Hearing: WFL  Sensation:  No c/o numbness or tingling  Tone: WFL  Edema: none noted     Facial laceration                Comments: Upon arrival patient lying in bed. Pt agreeable to OT session this date in collaboration w/ PT (x2 assist required). At end of session, patient lying in bed (bed alarm on) with call light and phone within reach, all lines and tubes intact. Overall patient demonstrated decreased independence and safety during completion of ADL/functional transfer/mobility tasks. Pt would benefit from continued skilled OT to increase safety and independence with completion of ADL/IADL tasks for functional independence and quality of life. Treatment: OT treatment provided this date includes:  Facilitation of bed mobility (cues for body mechanics, sequencing and attention), unsupported sitting balance (addressing posture, attention to tasks, weight shifting and safety to prep for ADL's), functional transfers (education/max cues for safety/hand placement), standing tolerance tasks (addressing posture, forward gaze, balance and activity tolerance impacting ADLs) and lateral sidesteps to [facility] with w/w (in preparation to/from bathroom w/ education/max cuing on posture, w/w management, forward gaze and safety.  Increased difficulty following max cues for LE placement - assistance required to advance B LE's, poor coordination and balance deficits noted). Therapist facilitated self-care retraining: toileting task and seated grooming tasks (2x) while educating/cuing pt on modified techniques, sequencing, attention, posture, safety and energy conservation techniques. Skilled monitoring of HR, O2 sats and pts response to treatment. mod  Profile and History- med (extensive chart review)  Assessment of Occupational Performance and Identification of Deficits- med  Clinical Decision Making- med    Evaluation Time includes thorough review of current medical information, gathering information on past medical history/social history and prior level of function, completion of standardized testing/informal observation of tasks, assessment of data and education on plan of care and goals.     Assessment of current deficits   Functional mobility [x]  ADLs [x] Strength [x]  Cognition [x]  Functional transfers  [x] IADLs [x] Safety Awareness [x]  Endurance [x]  Fine Motor Coordination [] Balance [x] Vision/perception [] Sensation []   Gross Motor Coordination [x] ROM [] Delirium []                  Motor Control []    Plan of Care: 1-3 days/week for 1-2 weeks PRN   Instruction/training on adapted ADL techniques and AE recommendations to increase functional independence within precautions  Training on energy conservation strategies/techniques to improve independence/tolerance for self-care routine  Functional transfer/mobility training/DME recommendations for increased independence, safety, and fall prevention  Patient/Family education to increase follow through with safety techniques and functional independence  Recommendation of environmental modifications for increased safety with functional transfers/mobility and ADLs  Cognitive retraining/development of therapeutic activities to improve problem solving, judgement, memory, and attention for increased safety/participation in ADL/IADL tasks  Therapeutic exercise to improve motor endurance, ROM, and functional strength for ADLs/functional transfers  Therapeutic activities to facilitate/challenge dynamic balance, stand tolerance, fine motor dexterity/in-hand manipulation for increased independence with ADLs  Positioning to improve functional independence    Rehab Potential: Good for established goals    Patient / Family Goal: Not stated     Patient and/or family were instructed on diagnosis, prognosis/goals and plan of care. Daughter in law demonstrated good understanding. [] Malnutrition indicators have been identified and nursing has been notified to ensure a dietitian consult is ordered.        Mod Evaluation completed +              Time In: 10:52  Time Out: 11:15  Total Treatment Time: 15 minutes   Min Units   OT Eval Low 26388     OT Eval Medium 97166 X 1   OT Eval High 22620     OT Re-Eval [id number]     Therapeutic Ex 25372     Therapeutic Activities 66602 10 1   ADL/Self Care 69531 5 0   Orthotic Management 58101     Neuro Re-Ed [address] [place] [address], OTR/L #[id number]

## 2021-02-15 NOTE — PROGRESS NOTES
Nurse to nurse report called to [address],  time 1530.  Electronically signed by Alejandro Caraballo RN on 2/15/2021 at 2:25 PM

## 2021-02-15 NOTE — DISCHARGE INSTR - COC
Continuity of Care Form    Patient Name: Alan Chiang   :  10/9/1927  MRN:  [id number]    Admit date:  2021  Discharge date:  2/15/21    Code Status Order: Full Code   Advance Directives:      Admitting Physician:  Elicia Henry MD  PCP: Elicia Henry MD    Discharging Nurse: Phoebe Hugo [facility] Unit/Room#: 0468/3299-O  Discharging Unit Phone Number: [phone]    Emergency Contact:   Extended Emergency Contact Information  Primary Emergency Contact: Lajean Gitelman  Address: [address] [place] [address], [address] [place] [address] Phone: [phone]  Relation: Child  Secondary Emergency Contact: Tyrone Phone: [phone]  Relation: Other    Past Surgical History:  Past Surgical History:   Procedure Laterality Date    HAND SURGERY         Immunization History:   Immunization History   Administered Date(s) Administered    Influenza, High Dose (Fluzone 65 yrs and older) 2015, 2016, 10/06/2017, 10/03/2018, 2019    Pneumococcal Conjugate 13-valent (Jerris Srinath) 2015    Tdap (Boostrix, Adacel) 2021       Active Problems:  Patient Active Problem List   Diagnosis Code    Hyperlipidemia E78.5    Depression F32.9    B12 deficiency E53.8    Type 2 diabetes mellitus ([facility] [address] E11.9    Orthostasis I95.1    Lacunar stroke ([facility] [address] I63.81    Elevated diaphragm J98.6    Dyspnea R06.00    Multiple lung nodules R91.8    Cataract H26.9    Syncope and collapse R55       Isolation/Infection:   Isolation            No Isolation          Patient Infection Status       Infection Onset Added Last Indicated Last Indicated By Review Planned Expiration Resolved Resolved By    COVID-19 Rule Out 02/15/21 02/15/21 02/15/21 COVID-19 (Ordered) 21              Nurse Assessment:  Last Vital Signs: BP (!) 152/86   Pulse 82   Temp 97.5 °F (36.4 °C) (Oral)   Resp 18   Ht 6' (1.829 m)   Wt 175 lb (79.4 kg)   SpO2 96%   BMI MAR  Oxygen Therapy:  is not on home oxygen therapy. Ventilator:    - No ventilator support    Rehab Therapies: Physical Therapy and Occupational Therapy  Weight Bearing Status/Restrictions: No weight bearing restirctions  Other Medical Equipment (for information only, NOT a DME order):  walker, bath bench, bedside commode and hospital bed  Other Treatments: N/A    Patient's personal belongings (please select all that are sent with patient):  Dentures upper and lower, shoes, pants, shirts, belt, wallet, glasses    RN SIGNATURE:  Electronically signed by Charly Foster RN on 2/15/21 at 1:29 PM EST    CASE MANAGEMENT/SOCIAL WORK SECTION    Inpatient Status Date: ***    Readmission Risk Assessment Score:  Readmission Risk              Risk of Unplanned Readmission:        12           Discharging to Facility/ Agency   · Name:   · Address:  · Phone:  · Fax:    Dialysis Facility (if applicable)   · Name:  · Address:  · Dialysis Schedule:  · Phone:  · Fax:    / signature: {Esignature:[id number]:::0}    PHYSICIAN SECTION    Prognosis: {Prognosis:2703316458:::0}    Condition at Discharge: [facility] Patient Condition:123283816:::0}    Rehab Potential (if transferring to Rehab): {Prognosis:6686498663:::0}    Recommended Labs or Other Treatments After Discharge: ***    Physician Certification: I certify the above information and transfer of Charles Echols  is necessary for the continuing treatment of the diagnosis listed and that he requires {Admit to Appropriate Level of Care:56789:::0} for {GREATER/LESS:381171517} 30 days.      Update Admission H&P: {CHP DME Changes in HandP:969853834:::0}    PHYSICIAN SIGNATURE:  {Esignature:[id number]:::0}Electronically signed by Marilin Jauregui DO on 2/15/2021 at 1:22 PM

## 2021-02-15 NOTE — PROGRESS NOTES
Nurse to nurse report called to 80, transport placed.  Electronically signed by Jakob Arroyo RN on 2/15/2021 at 1:05 PM

## 2021-02-15 NOTE — PROGRESS NOTES
Physical Therapy    Physical Therapy Initial Assessment     Name: Cecilia Caldera  : 10/9/1927  MRN: [id number]    Referring Provider:  Beverley Edgar MD    Date of Service: 2/15/2021    Evaluating PT:  Lian Abraham, PT, DPT [id number]      Room #:  0798/8305-M  Diagnosis:  Syncope and collapse  PMHx/PSHx:  Diabetes mellitus, anxiety, HLD, ADD, HTN, B12 deficiency, diabetes, depression  Procedure/Surgery:  None this admission  Precautions:  Falls, orthostatic blood pressures, cognition  Equipment Needs:  TBD    SUBJECTIVE:    Pt lives with his wife in a single story house with 2 stairs to enter and 1 rail. Bed is on first floor and bath is on first floor. Pt's daughter-in-law reports she and her  assist pt and pt's wife PRN but do not stay with them . Pt ambulated with no AD as he refuses use of walker per dtr in law-- she reports he has been having increasing unsteadiness and frequent falls at home. OBJECTIVE:   Initial Evaluation  Date: 2/15/21 Treatment Short Term/ Long Term   Goals   AM-PAC 6 Clicks 96/14     Was pt agreeable to Eval/treatment? yes     Does pt have pain? No c/o pain     Bed Mobility  Rolling: Min A  Supine to sit: Min A  Sit to supine: Min A  Scooting: Min A to EOB  Rolling: Mod Independent   Supine to sit: Mod Independent   Sit to supine: Mod Independent   Scooting: Mod Independent    Transfers Sit to stand: Mod A  Stand to sit: Mod A  Stand pivot: NT  Sit to stand: Supervision  Stand to sit: Supervision  Stand pivot: Supervision   Ambulation    2 feet side stepping to R with FWW with Max A x2  50 feet with AAD with Min A   Stair negotiation: ascended and descended  NT  TBD   ROM BUE:  WNL  BLE:  WNL     Strength BUE:  WNL  BLE:  4-/5     Balance Sitting EOB:  Min A>SBA  Dynamic Standing: Max A x2 with FWW  Sitting EOB:  Independent  Dynamic Standing:  Min A with AAD     Pt is A & O x 2 self and place.  Pt not oriented to month or year  Sensation:  Pt denies numbness and tingling to extremities  Edema:  unremarkable    Patient education  Pt educated on role of therapy, objective findings of eval, safety with transfers and side steps    Patient response to education:   Pt verbalized understanding Pt demonstrated skill Pt requires further education in this area   yes With assistance yes     ASSESSMENT:    Comments:  RN clears pt for therapy eval this AM. Pt resting semi-supine upon arrival, agreeable to PT eval. Pt requires step-wise cues with verbal/tactile input for initiation of all bed mobility and transfers. Manual assist required for trunk negotiation with supine<>sit. Pt requires lift/lower assistance for sit<>stand with max cues for hand placement. Pt maintains posterior listing upon standing and requires manual assist for anterior weight shift to prevent posterior LOB. Pt unable to follow verbal/visual cues for side stepping requiring manual assistance for weight shifting, progression of each LE, and manual assist for walker management. Pt unsafe to attempt additional ambulation this date due to poor ability to follow cues and balance deficits in standing. Pt returned to semi-supine upon completion of session as pt unsafe to leave OOB to chair. All needs in reach and bed alarm applied. He will benefit from continued skilled PT services to improve independence with all functional mobility, balance training, and safety awareness to decrease risk of falls and subsequent readmission. Treatment:  Patient practiced and was instructed in the following treatment:     Bed mobility: cues for sequencing with verba/tacile cues, manual assist for trunk lift/lower   Transfer training: cues for B UE placement, manual assist for lift/lower, max cues for use of walker safely   Gait training: side stepping towards R, attempted towards L unsuccessfully. Manual assist for walker stabilization and balance, manual assist to progress each LE and complete weight shifting    Pt's/ family goals   1.  To restore max independence with mobility and safety    Patient and or family understand(s) diagnosis, prognosis, and plan of care. Yes, daughter-in-law present throughout evaluation     PLAN OF CARE:    Current Treatment Recommendations   [x] Strengthening     [] ROM   [x] Balance Training   [x] Endurance Training   [x] Transfer Training   [x] Gait Training   [] Stair Training   [x] Positioning   [x] Safety and Education Training   [x] Patient/Caregiver Education   [] HEP  [] Other       PT care will be provided in accordance with the objectives noted above. Whenever appropriate, clear delegation orders will be provided for nursing staff. Exercises and functional mobility practice will be used as well as appropriate assistive devices or modalities to obtain goals. Patient and family education will also be administered as needed. Frequency of treatments: 2-5x/week x 7-10 days. Time in  1052  Time out  1115    Total Treatment Time  15 minutes     Evaluation Time includes thorough review of current medical information, gathering information on past medical history/social history and prior level of function, completion of standardized testing/informal observation of tasks, assessment of data and education on plan of care and goals.     CPT codes:  [] Low Complexity PT evaluation 89142  [x] Moderate Complexity PT evaluation 83457  [] High Complexity PT evaluation 88747  [] PT Re-evaluation 22680  [] Gait training 62652 0 minutes  [] Manual therapy 02909 0 minutes  [x] Therapeutic activities 83538 15 minutes  [] Therapeutic exercises 29343 0 minutes  [] Neuromuscular reeducation 25000 0 minutes     Aimee Fatima, PT, DPT  [id number]

## 2021-02-17 ENCOUNTER — TELEPHONE (OUTPATIENT)
Dept: CARDIOLOGY CLINIC | Age: 86
End: 2021-02-17

## 2021-02-17 NOTE — TELEPHONE ENCOUNTER
I called patient to schedule a hospital f/u but I was informed by a family member(Jennifer) that he is in rehab and that she wants to talk to his pcp before scheduling with cardiology.

## 2021-02-23 NOTE — DISCHARGE SUMMARY
Physician Discharge Summary     Patient ID:  Sara Hyatt  [id number]  26 y.o.  10/9/1927    Admit date: 2/11/2021    Discharge date and time: 2/15/2021  3:40 PM     Admission Diagnoses: Syncope and collapse [R55]    Discharge Diagnoses:   Patient Active Problem List   Diagnosis    Hyperlipidemia    Depression    B12 deficiency    Type 2 diabetes mellitus ([facility] [address]    Orthostasis    Lacunar stroke ([facility] [address]    Elevated diaphragm    Dyspnea    Multiple lung nodules    Cataract    Syncope and collapse     Medications Discontinued During This Encounter   Medication Reason    brimonidine (ALPHAGAN) 0.2 % ophthalmic solution LIST CLEANUP    Elastic Bandages & Supports (B-4 MED COMPRESSION HOSE MENS) MISC LIST CLEANUP    NONFORMULARY LIST CLEANUP    polyethylene glycol (GLYCOLAX) powder LIST CLEANUP    simvastatin (ZOCOR) 80 MG tablet LIST CLEANUP    midodrine (PROAMATINE) 5 MG tablet LIST CLEANUP    0.45 % sodium chloride infusion     timolol (TIMOPTIC) 0.5 % ophthalmic solution Stop Taking at Discharge    midodrine (PROAMATINE) tablet 5 mg Patient Discharge    perflutren lipid microspheres (DEFINITY) injection 1.65 mg Patient Discharge    dextrose 5 % solution Patient Discharge    glucagon (rDNA) injection 1 mg Patient Discharge    dextrose 50 % IV solution Patient Discharge    glucose (GLUTOSE) 40 % oral gel 15 g Patient Discharge    insulin lispro (HUMALOG) injection vial 0-3 Units Patient Discharge    insulin lispro (HUMALOG) injection vial 0-6 Units Patient Discharge    acetaminophen (TYLENOL) suppository 650 mg Patient Discharge    acetaminophen (TYLENOL) tablet 650 mg Patient Discharge    polyethylene glycol (GLYCOLAX) packet 17 g Patient Discharge    ondansetron (ZOFRAN) injection 4 mg Patient Discharge    promethazine (PHENERGAN) tablet 12.5 mg Patient Discharge    enoxaparin (LOVENOX) injection 40 mg Patient Discharge    sodium chloride flush 0.9 % injection 10 mL Patient Discharge    sodium chloride flush 0.9 % injection 10 mL Patient Discharge    aspirin chewable tablet 81 mg Patient Discharge    timolol (TIMOPTIC) 0.5 % ophthalmic solution 1 drop Patient Discharge    simvastatin (ZOCOR) tablet 20 mg Patient Discharge    latanoprost (XALATAN) 0.005 % ophthalmic solution 1 drop Patient Discharge    glimepiride (AMARYL) tablet 2 mg Patient Discharge    docusate sodium (COLACE) capsule 100 mg Patient Discharge    citalopram (CELEXA) tablet 20 mg Patient Discharge    vitamin D (CHOLECALCIFEROL) tablet 2,000 Units Patient Discharge    buPROPion [facility]) tablet 75 mg Patient Discharge             Procedures: None     Hospital Course: Stable, he was eating well and ambulating with help, his symptoms were improved. Family was in agreement with him going to SNF. Discharge Exam:  See progress note from today    Disposition: to SNF  in stable condition for rehab. Long-term prognosis is fair. He will see Dr. Ankita Basilio at [facility].     Charo Allan  2/23/2021

## 2022-06-20 ENCOUNTER — HOSPITAL ENCOUNTER (INPATIENT)
Age: 87
LOS: 2 days | Discharge: NURSING, REHAB OR LONG-TERM CARE | DRG: 918 | End: 2022-06-22
Attending: EMERGENCY MEDICINE | Admitting: INTERNAL MEDICINE
Payer: MEDICARE

## 2022-06-20 ENCOUNTER — APPOINTMENT (OUTPATIENT)
Dept: CT IMAGING | Age: 87
DRG: 918 | End: 2022-06-20
Payer: MEDICARE

## 2022-06-20 ENCOUNTER — APPOINTMENT (OUTPATIENT)
Dept: GENERAL RADIOLOGY | Age: 87
DRG: 918 | End: 2022-06-20
Payer: MEDICARE

## 2022-06-20 DIAGNOSIS — T50.901A ACCIDENTAL DRUG INGESTION, INITIAL ENCOUNTER: ICD-10-CM

## 2022-06-20 DIAGNOSIS — R41.0 DELIRIUM: Primary | ICD-10-CM
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
## 2022-06-20 PROBLEM — R41.82 AMS (ALTERED MENTAL STATUS): Status: ACTIVE | Noted: 2022-06-20

## 2022-06-20 PROBLEM — R55 SYNCOPE AND COLLAPSE: Status: RESOLVED | Noted: 2021-02-13 | Resolved: 2022-06-20

## 2022-06-20 LAB
ACETAMINOPHEN LEVEL: <5 MCG/ML (ref 10–30)
ALBUMIN SERPL-MCNC: 3.8 G/DL (ref 3.5–5.2)
ALP BLD-CCNC: 88 U/L (ref 40–129)
ALT SERPL-CCNC: 16 U/L (ref 0–40)
ANION GAP SERPL CALCULATED.3IONS-SCNC: 10 MMOL/L (ref 7–16)
AST SERPL-CCNC: 30 U/L (ref 0–39)
BASOPHILS ABSOLUTE: 0.04 E9/L (ref 0–0.2)
BASOPHILS RELATIVE PERCENT: 0.5 % (ref 0–2)
BILIRUB SERPL-MCNC: 0.4 MG/DL (ref 0–1.2)
BUN BLDV-MCNC: 17 MG/DL (ref 6–23)
CALCIUM SERPL-MCNC: 8.9 MG/DL (ref 8.6–10.2)
CHLORIDE BLD-SCNC: 104 MMOL/L (ref 98–107)
CO2: 23 MMOL/L (ref 22–29)
CREAT SERPL-MCNC: 1.1 MG/DL (ref 0.7–1.2)
EOSINOPHILS ABSOLUTE: 0.05 E9/L (ref 0.05–0.5)
EOSINOPHILS RELATIVE PERCENT: 0.6 % (ref 0–6)
ETHANOL: <10 MG/DL (ref 0–0.08)
GFR AFRICAN AMERICAN: >60
GFR NON-AFRICAN AMERICAN: >60 ML/MIN/1.73
GLUCOSE BLD-MCNC: 103 MG/DL (ref 74–99)
HCT VFR BLD CALC: 41.2 % (ref 37–54)
HEMOGLOBIN: 13.1 G/DL (ref 12.5–16.5)
IMMATURE GRANULOCYTES #: 0.06 E9/L
IMMATURE GRANULOCYTES %: 0.7 % (ref 0–5)
INR BLD: 1.2
LACTIC ACID: 1.8 MMOL/L (ref 0.5–2.2)
LYMPHOCYTES ABSOLUTE: 1.41 E9/L (ref 1.5–4)
LYMPHOCYTES RELATIVE PERCENT: 16.3 % (ref 20–42)
MCH RBC QN AUTO: 30.8 PG (ref 26–35)
MCHC RBC AUTO-ENTMCNC: 31.8 % (ref 32–34.5)
MCV RBC AUTO: 96.9 FL (ref 80–99.9)
METER GLUCOSE: 103 MG/DL (ref 74–99)
METER GLUCOSE: 110 MG/DL (ref 74–99)
METER GLUCOSE: 93 MG/DL (ref 74–99)
MONOCYTES ABSOLUTE: 0.77 E9/L (ref 0.1–0.95)
MONOCYTES RELATIVE PERCENT: 8.9 % (ref 2–12)
NEUTROPHILS ABSOLUTE: 6.3 E9/L (ref 1.8–7.3)
NEUTROPHILS RELATIVE PERCENT: 73 % (ref 43–80)
PDW BLD-RTO: 14.1 FL (ref 11.5–15)
PLATELET # BLD: 207 E9/L (ref 130–450)
PMV BLD AUTO: 11 FL (ref 7–12)
POTASSIUM REFLEX MAGNESIUM: 5.6 MMOL/L (ref 3.5–5)
POTASSIUM SERPL-SCNC: 2.8 MMOL/L (ref 3.5–5)
PRO-BNP: 221 PG/ML (ref 0–450)
PROTHROMBIN TIME: 13.1 SEC (ref 9.3–12.4)
RBC # BLD: 4.25 E12/L (ref 3.8–5.8)
REASON FOR REJECTION: NORMAL
REASON FOR REJECTION: NORMAL
REJECTED TEST: NORMAL
REJECTED TEST: NORMAL
SALICYLATE, SERUM: <0.3 MG/DL (ref 0–30)
SODIUM BLD-SCNC: 137 MMOL/L (ref 132–146)
TOTAL PROTEIN: 6.7 G/DL (ref 6.4–8.3)
TRICYCLIC ANTIDEPRESSANTS SCREEN SERUM: NEGATIVE NG/ML
TROPONIN, HIGH SENSITIVITY: 16 NG/L (ref 0–11)
WBC # BLD: 8.6 E9/L (ref 4.5–11.5)

## 2022-06-20 PROCEDURE — 84132 ASSAY OF SERUM POTASSIUM: CPT

## 2022-06-20 PROCEDURE — 85610 PROTHROMBIN TIME: CPT

## 2022-06-20 PROCEDURE — 2580000003 HC RX 258: Performed by: INTERNAL MEDICINE

## 2022-06-20 PROCEDURE — 2580000003 HC RX 258

## 2022-06-20 PROCEDURE — 82077 ASSAY SPEC XCP UR&BREATH IA: CPT

## 2022-06-20 PROCEDURE — 84484 ASSAY OF TROPONIN QUANT: CPT

## 2022-06-20 PROCEDURE — 80143 DRUG ASSAY ACETAMINOPHEN: CPT

## 2022-06-20 PROCEDURE — 83605 ASSAY OF LACTIC ACID: CPT

## 2022-06-20 PROCEDURE — 80307 DRUG TEST PRSMV CHEM ANLYZR: CPT

## 2022-06-20 PROCEDURE — 80053 COMPREHEN METABOLIC PANEL: CPT

## 2022-06-20 PROCEDURE — 82962 GLUCOSE BLOOD TEST: CPT

## 2022-06-20 PROCEDURE — 85025 COMPLETE CBC W/AUTO DIFF WBC: CPT

## 2022-06-20 PROCEDURE — 6370000000 HC RX 637 (ALT 250 FOR IP): Performed by: INTERNAL MEDICINE

## 2022-06-20 PROCEDURE — 1200000000 HC SEMI PRIVATE

## 2022-06-20 PROCEDURE — 6360000002 HC RX W HCPCS: Performed by: INTERNAL MEDICINE

## 2022-06-20 PROCEDURE — 71045 X-RAY EXAM CHEST 1 VIEW: CPT

## 2022-06-20 PROCEDURE — 72125 CT NECK SPINE W/O DYE: CPT

## 2022-06-20 PROCEDURE — 70450 CT HEAD/BRAIN W/O DYE: CPT

## 2022-06-20 PROCEDURE — 99285 EMERGENCY DEPT VISIT HI MDM: CPT

## 2022-06-20 PROCEDURE — 93005 ELECTROCARDIOGRAM TRACING: CPT

## 2022-06-20 PROCEDURE — 80179 DRUG ASSAY SALICYLATE: CPT

## 2022-06-20 PROCEDURE — 83880 ASSAY OF NATRIURETIC PEPTIDE: CPT

## 2022-06-20 RX ORDER — BUPROPION HYDROCHLORIDE 75 MG/1
75 TABLET ORAL 2 TIMES DAILY
Status: DISCONTINUED | OUTPATIENT
Start: 2022-06-20 | End: 2022-06-22 | Stop reason: HOSPADM

## 2022-06-20 RX ORDER — SODIUM CHLORIDE 0.9 % (FLUSH) 0.9 %
10 SYRINGE (ML) INJECTION EVERY 12 HOURS SCHEDULED
Status: DISCONTINUED | OUTPATIENT
Start: 2022-06-20 | End: 2022-06-22 | Stop reason: HOSPADM

## 2022-06-20 RX ORDER — MIDODRINE HYDROCHLORIDE 5 MG/1
5 TABLET ORAL 2 TIMES DAILY
COMMUNITY

## 2022-06-20 RX ORDER — VITS A,C,E/LUTEIN/MINERALS 300MCG-200
1 TABLET ORAL DAILY
Status: DISCONTINUED | OUTPATIENT
Start: 2022-06-21 | End: 2022-06-22 | Stop reason: HOSPADM

## 2022-06-20 RX ORDER — ATORVASTATIN CALCIUM 10 MG/1
10 TABLET, FILM COATED ORAL DAILY
Status: DISCONTINUED | OUTPATIENT
Start: 2022-06-21 | End: 2022-06-22 | Stop reason: HOSPADM

## 2022-06-20 RX ORDER — SODIUM CHLORIDE 9 MG/ML
INJECTION, SOLUTION INTRAVENOUS PRN
Status: DISCONTINUED | OUTPATIENT
Start: 2022-06-20 | End: 2022-06-22 | Stop reason: HOSPADM

## 2022-06-20 RX ORDER — ASPIRIN 81 MG/1
81 TABLET, CHEWABLE ORAL EVERY MORNING
Status: DISCONTINUED | OUTPATIENT
Start: 2022-06-21 | End: 2022-06-22 | Stop reason: HOSPADM

## 2022-06-20 RX ORDER — DEXTROSE MONOHYDRATE 50 MG/ML
100 INJECTION, SOLUTION INTRAVENOUS PRN
Status: DISCONTINUED | OUTPATIENT
Start: 2022-06-20 | End: 2022-06-22 | Stop reason: HOSPADM

## 2022-06-20 RX ORDER — ACETAMINOPHEN 650 MG/1
650 SUPPOSITORY RECTAL EVERY 6 HOURS PRN
Status: DISCONTINUED | OUTPATIENT
Start: 2022-06-20 | End: 2022-06-22 | Stop reason: HOSPADM

## 2022-06-20 RX ORDER — ENOXAPARIN SODIUM 100 MG/ML
40 INJECTION SUBCUTANEOUS DAILY
Status: DISCONTINUED | OUTPATIENT
Start: 2022-06-20 | End: 2022-06-22 | Stop reason: HOSPADM

## 2022-06-20 RX ORDER — CITALOPRAM 20 MG/1
20 TABLET ORAL DAILY
Status: DISCONTINUED | OUTPATIENT
Start: 2022-06-21 | End: 2022-06-22 | Stop reason: HOSPADM

## 2022-06-20 RX ORDER — VIT A/VIT C/VIT E/ZINC/COPPER 4296-226
1 CAPSULE ORAL EVERY MORNING
Status: DISCONTINUED | OUTPATIENT
Start: 2022-06-21 | End: 2022-06-20 | Stop reason: CLARIF

## 2022-06-20 RX ORDER — DOCUSATE SODIUM 100 MG/1
100 CAPSULE, LIQUID FILLED ORAL 2 TIMES DAILY
Status: DISCONTINUED | OUTPATIENT
Start: 2022-06-20 | End: 2022-06-22 | Stop reason: HOSPADM

## 2022-06-20 RX ORDER — ACETAMINOPHEN 325 MG/1
650 TABLET ORAL EVERY 6 HOURS PRN
Status: DISCONTINUED | OUTPATIENT
Start: 2022-06-20 | End: 2022-06-22 | Stop reason: HOSPADM

## 2022-06-20 RX ORDER — ONDANSETRON 2 MG/ML
4 INJECTION INTRAMUSCULAR; INTRAVENOUS EVERY 6 HOURS PRN
Status: DISCONTINUED | OUTPATIENT
Start: 2022-06-20 | End: 2022-06-22 | Stop reason: HOSPADM

## 2022-06-20 RX ORDER — INSULIN LISPRO 100 [IU]/ML
0-12 INJECTION, SOLUTION INTRAVENOUS; SUBCUTANEOUS
Status: DISCONTINUED | OUTPATIENT
Start: 2022-06-21 | End: 2022-06-22 | Stop reason: HOSPADM

## 2022-06-20 RX ORDER — INSULIN LISPRO 100 [IU]/ML
0-6 INJECTION, SOLUTION INTRAVENOUS; SUBCUTANEOUS NIGHTLY
Status: DISCONTINUED | OUTPATIENT
Start: 2022-06-20 | End: 2022-06-22 | Stop reason: HOSPADM

## 2022-06-20 RX ORDER — LATANOPROST 50 UG/ML
1 SOLUTION/ DROPS OPHTHALMIC NIGHTLY
Status: DISCONTINUED | OUTPATIENT
Start: 2022-06-20 | End: 2022-06-22 | Stop reason: HOSPADM

## 2022-06-20 RX ORDER — SODIUM CHLORIDE 0.9 % (FLUSH) 0.9 %
10 SYRINGE (ML) INJECTION PRN
Status: DISCONTINUED | OUTPATIENT
Start: 2022-06-20 | End: 2022-06-22 | Stop reason: HOSPADM

## 2022-06-20 RX ORDER — POTASSIUM CHLORIDE 20 MEQ/1
40 TABLET, EXTENDED RELEASE ORAL PRN
Status: DISCONTINUED | OUTPATIENT
Start: 2022-06-20 | End: 2022-06-22 | Stop reason: HOSPADM

## 2022-06-20 RX ORDER — VIT A/VIT C/VIT E/ZINC/COPPER 4296-226
1 CAPSULE ORAL EVERY MORNING
COMMUNITY

## 2022-06-20 RX ORDER — CHOLECALCIFEROL (VITAMIN D3) 50 MCG
2000 TABLET ORAL EVERY MORNING
Status: DISCONTINUED | OUTPATIENT
Start: 2022-06-21 | End: 2022-06-22 | Stop reason: HOSPADM

## 2022-06-20 RX ORDER — MIDODRINE HYDROCHLORIDE 5 MG/1
5 TABLET ORAL 2 TIMES DAILY
Status: DISCONTINUED | OUTPATIENT
Start: 2022-06-21 | End: 2022-06-22 | Stop reason: HOSPADM

## 2022-06-20 RX ORDER — POTASSIUM CHLORIDE 7.45 MG/ML
10 INJECTION INTRAVENOUS PRN
Status: DISCONTINUED | OUTPATIENT
Start: 2022-06-20 | End: 2022-06-22 | Stop reason: HOSPADM

## 2022-06-20 RX ORDER — 0.9 % SODIUM CHLORIDE 0.9 %
250 INTRAVENOUS SOLUTION INTRAVENOUS ONCE
Status: COMPLETED | OUTPATIENT
Start: 2022-06-20 | End: 2022-06-20

## 2022-06-20 RX ORDER — SENNA PLUS 8.6 MG/1
1 TABLET ORAL DAILY PRN
Status: DISCONTINUED | OUTPATIENT
Start: 2022-06-20 | End: 2022-06-22 | Stop reason: HOSPADM

## 2022-06-20 RX ORDER — ONDANSETRON 4 MG/1
4 TABLET, ORALLY DISINTEGRATING ORAL EVERY 8 HOURS PRN
Status: DISCONTINUED | OUTPATIENT
Start: 2022-06-20 | End: 2022-06-22 | Stop reason: HOSPADM

## 2022-06-20 RX ADMIN — ENOXAPARIN SODIUM 40 MG: 100 INJECTION SUBCUTANEOUS at 21:13

## 2022-06-20 RX ADMIN — SODIUM CHLORIDE: 9 INJECTION, SOLUTION INTRAVENOUS at 23:33

## 2022-06-20 RX ADMIN — SODIUM CHLORIDE, PRESERVATIVE FREE 10 ML: 5 INJECTION INTRAVENOUS at 21:15

## 2022-06-20 RX ADMIN — SODIUM CHLORIDE 250 ML: 9 INJECTION, SOLUTION INTRAVENOUS at 13:53

## 2022-06-20 RX ADMIN — DOCUSATE SODIUM 100 MG: 100 CAPSULE, LIQUID FILLED ORAL at 21:12

## 2022-06-20 RX ADMIN — LATANOPROST 1 DROP: 50 SOLUTION OPHTHALMIC at 21:12

## 2022-06-20 RX ADMIN — BUPROPION HYDROCHLORIDE 75 MG: 75 TABLET ORAL at 21:12

## 2022-06-20 RX ADMIN — POTASSIUM CHLORIDE 10 MEQ: 7.46 INJECTION, SOLUTION INTRAVENOUS at 23:35

## 2022-06-20 ASSESSMENT — ENCOUNTER SYMPTOMS
WHEEZING: 0
TROUBLE SWALLOWING: 0
BACK PAIN: 0
COUGH: 0
VOMITING: 0
SHORTNESS OF BREATH: 0
NAUSEA: 0
DIARRHEA: 0
SORE THROAT: 0
RHINORRHEA: 0
VOICE CHANGE: 0
PHOTOPHOBIA: 0
ABDOMINAL PAIN: 0

## 2022-06-20 ASSESSMENT — PAIN SCALES - GENERAL: PAINLEVEL_OUTOF10: 0

## 2022-06-20 NOTE — ED PROVIDER NOTES
80y.o. year old male presenting to the emergency room with concerns of altered mental status, fall beginning this morning. Daughter reports that symptom's onset today. Worsen with nothing. Improves with nothing. Severity of mild, with no radiation. Symptoms are constant in timing. Symptoms described as increased confusion worsening with concern for fall today. .  Daughter-in-law reports associated symptoms of left arm wound. Patient in no acute distress. Daughter in law reports that took 2 days worth of daily pills, daughter in law states between 9 Pm and 9 Am this morning was possible ingestion time. Patient has had gradual confusion, currently home alone his wife is in a rehab facility and family is interested in placement. Daughter-in-law reports that she saw blood in stairwell with a fresh wound on patient's left arm  Chief Complaint   Patient presents with    Drug Overdose     took to much of daily meds out of pill     Altered Mental Status    Fall     possible    Other     unable to care for self home alone family want placement. Review of Systems   Constitutional: Negative for activity change, chills, fatigue and fever. HENT: Negative for congestion, rhinorrhea, sore throat, trouble swallowing and voice change. Eyes: Negative for photophobia and visual disturbance. Respiratory: Negative for cough, shortness of breath and wheezing. Cardiovascular: Negative for chest pain. Gastrointestinal: Negative for abdominal pain, diarrhea, nausea and vomiting. Genitourinary: Negative for difficulty urinating, flank pain, hematuria and urgency. Musculoskeletal: Negative for arthralgias, back pain and neck pain. Skin: Positive for wound. Negative for rash. Neurological: Negative for dizziness, syncope, weakness, numbness and headaches. Psychiatric/Behavioral: Positive for confusion. Negative for behavioral problems. The patient is not nervous/anxious.          Physical Exam  Vitals reviewed. Constitutional:       General: He is not in acute distress. Appearance: Normal appearance. He is well-developed. He is not ill-appearing. HENT:      Head: Normocephalic. Right Ear: External ear normal.      Left Ear: External ear normal.      Nose: Nose normal.      Mouth/Throat:      Mouth: Mucous membranes are moist.   Eyes:      General: No scleral icterus. Right eye: No discharge. Left eye: No discharge. Extraocular Movements:      Right eye: No nystagmus. Left eye: No nystagmus. Conjunctiva/sclera: Conjunctivae normal.   Cardiovascular:      Rate and Rhythm: Normal rate and regular rhythm. Heart sounds: No friction rub. No gallop. Pulmonary:      Effort: Pulmonary effort is normal. No respiratory distress. Breath sounds: No stridor. No wheezing, rhonchi or rales. Abdominal:      General: There is no distension. Palpations: Abdomen is soft. Tenderness: There is no abdominal tenderness. There is no guarding or rebound. Musculoskeletal:         General: No swelling, tenderness or deformity. Cervical back: Normal range of motion and neck supple. No rigidity or tenderness. Skin:     General: Skin is warm. Coloration: Skin is not jaundiced. Findings: Abrasion present. Neurological:      Mental Status: He is alert and oriented to person, place, and time. He is confused. Cranial Nerves: No dysarthria or facial asymmetry. Sensory: No sensory deficit. Motor: No weakness. Psychiatric:         Mood and Affect: Mood normal. Mood is not anxious. Behavior: Behavior normal.          Procedures     EKG: This EKG is signed by emergency department physician.     Rate: 71  Rhythm: Sinus  Interpretation: sinus rhythm  Comparison: stable as compared to patient's most recent EKG     MDM  Number of Diagnoses or Management Options  Diagnosis management comments: 66-year-old male presenting to emergency department with daughter-in-law for concerns of altered mental status with possible fall today. Family is interested in placement. Patient took 2 days worth of medications from pill , per daughter-in-law. Daughter-in-law states that she refilled got it last night at 7 PM and noticed that this morning at 9 AM.  Denies any symptomatology at this time. Left arm with abrasion. Denies any pain or tenderness over joints. Patient normally lives with wife who is currently in a rehab facility. No acute abnormality noted on CT with degenerative changes. Spoke to poison control who recommended that if patient's sugar remained normal, appropriate to watch 8 hours from 9 AM for any changes. Spoke to Dr. Allie Carrera discussed patient will plan to admit patient at this time for planned placement. Patient noted to have potassium 2.8, discussed with Dr. Allie Carrera who will put orders in. Patient stable at time of admission.        ED Course as of 06/23/22 0006 Mon Jun 20, 2022   1348 Spoke to [place], said unlikely with two days of medication at listed doses to be symptomatic    Check blood sugar, if not hypoglycemic = 8-12 hour if hypoglycemic => 24 hour watch  Check blood sugar Q2 hours unless borderline and then repeat every 1 hour    Buproprion 8-12 if asymptomatic with two days remaining below max therapeutic dose 450     Citalopram prolonged QTC, repeat EKG in two hours    Simvastatin no recommendations [PREET]      ED Course User Index  [PREET] Latrell Carr DO        ED Course as of 06/23/22 0006 Mon Jun 20, 2022   1348 Spoke to [place], said unlikely with two days of medication at listed doses to be symptomatic    Check blood sugar, if not hypoglycemic = 8-12 hour if hypoglycemic => 24 hour watch  Check blood sugar Q2 hours unless borderline and then repeat every 1 hour    Buproprion 8-12 if asymptomatic with two days remaining below max therapeutic dose 450     Citalopram Lymphocytes Absolute 1.41 (L) 1.50 - 4.00 E9/L    Monocytes Absolute 0.77 0.10 - 0.95 E9/L    Eosinophils Absolute 0.05 0.05 - 0.50 E9/L    Basophils Absolute 0.04 0.00 - 0.20 E9/L   Comprehensive Metabolic Panel w/ Reflex to MG   Result Value Ref Range    Sodium 137 132 - 146 mmol/L    Potassium reflex Magnesium 5.6 (H) 3.5 - 5.0 mmol/L    Chloride 104 98 - 107 mmol/L    CO2 23 22 - 29 mmol/L    Anion Gap 10 7 - 16 mmol/L    Glucose 103 (H) 74 - 99 mg/dL    BUN 17 6 - 23 mg/dL    CREATININE 1.1 0.7 - 1.2 mg/dL    GFR Non-African American >60 >=60 mL/min/1.73    GFR African American >60     Calcium 8.9 8.6 - 10.2 mg/dL    Total Protein 6.7 6.4 - 8.3 g/dL    Albumin 3.8 3.5 - 5.2 g/dL    Total Bilirubin 0.4 0.0 - 1.2 mg/dL    Alkaline Phosphatase 88 40 - 129 U/L    ALT 16 0 - 40 U/L    AST 30 0 - 39 U/L   Brain Natriuretic Peptide   Result Value Ref Range    Pro- 0 - 450 pg/mL   Protime-INR   Result Value Ref Range    Protime 13.1 (H) 9.3 - 12.4 sec    INR 1.2    Lactic Acid   Result Value Ref Range    Lactic Acid 1.8 0.5 - 2.2 mmol/L   Serum Drug Screen   Result Value Ref Range    Ethanol Lvl <10 mg/dL    Acetaminophen Level <5.0 (L) 10.0 - 35.5 mcg/mL    Salicylate, Serum <3.1 0.0 - 30.0 mg/dL    TCA Scrn NEGATIVE Cutoff:300 ng/mL   SPECIMEN REJECTION   Result Value Ref Range    Rejected Test TRP5     Reason for Rejection see below    Potassium   Result Value Ref Range    Potassium 2.8 (L) 3.5 - 5.0 mmol/L   Troponin   Result Value Ref Range    Troponin, High Sensitivity 16 (H) 0 - 11 ng/L   SPECIMEN REJECTION   Result Value Ref Range    Rejected Test TROP     Reason for Rejection see below    Comprehensive Metabolic Panel w/ Reflex to MG   Result Value Ref Range    Sodium 140 132 - 146 mmol/L    Potassium reflex Magnesium 4.7 3.5 - 5.0 mmol/L    Chloride 107 98 - 107 mmol/L    CO2 24 22 - 29 mmol/L    Anion Gap 9 7 - 16 mmol/L    Glucose 77 74 - 99 mg/dL    BUN 13 6 - 23 mg/dL    CREATININE 1.1 0.7 - 1.2 mg/dL    GFR Non-African American >60 >=60 mL/min/1.73    GFR African American >60     Calcium 8.5 (L) 8.6 - 10.2 mg/dL    Total Protein 5.9 (L) 6.4 - 8.3 g/dL    Albumin 3.6 3.5 - 5.2 g/dL    Total Bilirubin 0.5 0.0 - 1.2 mg/dL    Alkaline Phosphatase 87 40 - 129 U/L    ALT 12 0 - 40 U/L    AST 18 0 - 39 U/L   CBC auto differential   Result Value Ref Range    WBC 8.2 4.5 - 11.5 E9/L    RBC 3.94 3.80 - 5.80 E12/L    Hemoglobin 12.2 (L) 12.5 - 16.5 g/dL    Hematocrit 38.0 37.0 - 54.0 %    MCV 96.4 80.0 - 99.9 fL    MCH 31.0 26.0 - 35.0 pg    MCHC 32.1 32.0 - 34.5 %    RDW 13.8 11.5 - 15.0 fL    Platelets 733 095 - 678 E9/L    MPV 11.3 7.0 - 12.0 fL    Neutrophils % 64.8 43.0 - 80.0 %    Immature Granulocytes % 0.5 0.0 - 5.0 %    Lymphocytes % 22.4 20.0 - 42.0 %    Monocytes % 10.3 2.0 - 12.0 %    Eosinophils % 1.5 0.0 - 6.0 %    Basophils % 0.5 0.0 - 2.0 %    Neutrophils Absolute 5.29 1.80 - 7.30 E9/L    Immature Granulocytes # 0.04 E9/L    Lymphocytes Absolute 1.83 1.50 - 4.00 E9/L    Monocytes Absolute 0.84 0.10 - 0.95 E9/L    Eosinophils Absolute 0.12 0.05 - 0.50 E9/L    Basophils Absolute 0.04 0.00 - 0.20 E9/L   Hemoglobin A1c   Result Value Ref Range    Hemoglobin A1C 6.3 (H) 4.0 - 5.6 %   TSH   Result Value Ref Range    TSH 3.470 0.270 - 4.200 uIU/mL   Urinalysis with Microscopic   Result Value Ref Range    Color, UA Yellow Straw/Yellow    Clarity, UA Clear Clear    Glucose, Ur Negative Negative mg/dL    Bilirubin Urine Negative Negative    Ketones, Urine Negative Negative mg/dL    Specific Gravity, UA >=1.030 1.005 - 1.030    Blood, Urine Negative Negative    pH, UA 5.5 5.0 - 9.0    Protein, UA Negative Negative mg/dL    Urobilinogen, Urine 0.2 <2.0 E.U./dL    Nitrite, Urine Negative Negative    Leukocyte Esterase, Urine Negative Negative    WBC, UA 0-1 0 - 5 /HPF    RBC, UA 0-1 0 - 2 /HPF    Epithelial Cells, UA NONE SEEN /HPF    Bacteria, UA NONE SEEN None Seen /HPF   Vitamin B12 & Folate   Result Value Ref Range    Vitamin B-12 319 211 - 946 pg/mL    Folate 7.7 4.8 - 24.2 ng/mL   Comprehensive Metabolic Panel w/ Reflex to MG   Result Value Ref Range    Sodium 141 132 - 146 mmol/L    Potassium reflex Magnesium 4.9 3.5 - 5.0 mmol/L    Chloride 107 98 - 107 mmol/L    CO2 25 22 - 29 mmol/L    Anion Gap 9 7 - 16 mmol/L    Glucose 111 (H) 74 - 99 mg/dL    BUN 12 6 - 23 mg/dL    CREATININE 1.2 0.7 - 1.2 mg/dL    GFR Non-African American 56 >=60 mL/min/1.73    GFR African American >60     Calcium 9.1 8.6 - 10.2 mg/dL    Total Protein 6.0 (L) 6.4 - 8.3 g/dL    Albumin 3.8 3.5 - 5.2 g/dL    Total Bilirubin 0.5 0.0 - 1.2 mg/dL    Alkaline Phosphatase 91 40 - 129 U/L    ALT 15 0 - 40 U/L    AST 19 0 - 39 U/L   CBC auto differential   Result Value Ref Range    WBC 7.3 4.5 - 11.5 E9/L    RBC 3.99 3.80 - 5.80 E12/L    Hemoglobin 12.2 (L) 12.5 - 16.5 g/dL    Hematocrit 38.6 37.0 - 54.0 %    MCV 96.7 80.0 - 99.9 fL    MCH 30.6 26.0 - 35.0 pg    MCHC 31.6 (L) 32.0 - 34.5 %    RDW 13.8 11.5 - 15.0 fL    Platelets 166 234 - 728 E9/L    MPV 10.7 7.0 - 12.0 fL    Neutrophils % 63.8 43.0 - 80.0 %    Immature Granulocytes % 0.8 0.0 - 5.0 %    Lymphocytes % 22.3 20.0 - 42.0 %    Monocytes % 11.1 2.0 - 12.0 %    Eosinophils % 1.6 0.0 - 6.0 %    Basophils % 0.4 0.0 - 2.0 %    Neutrophils Absolute 4.65 1.80 - 7.30 E9/L    Immature Granulocytes # 0.06 E9/L    Lymphocytes Absolute 1.63 1.50 - 4.00 E9/L    Monocytes Absolute 0.81 0.10 - 0.95 E9/L    Eosinophils Absolute 0.12 0.05 - 0.50 E9/L    Basophils Absolute 0.03 0.00 - 0.20 E9/L   POCT Glucose   Result Value Ref Range    Meter Glucose 110 (H) 74 - 99 mg/dL   POCT Glucose   Result Value Ref Range    Meter Glucose 103 (H) 74 - 99 mg/dL   POCT Glucose   Result Value Ref Range    Meter Glucose 93 74 - 99 mg/dL   POCT Glucose   Result Value Ref Range    Meter Glucose 72 (L) 74 - 99 mg/dL   POCT Glucose   Result Value Ref Range    Meter Glucose 136 (H) 74 - 99 mg/dL   POCT Glucose   Result Value Ref Range    Meter Glucose 137 (H) 74 - 99 mg/dL   POCT Glucose   Result Value Ref Range    Meter Glucose 163 (H) 74 - 99 mg/dL   POCT Glucose   Result Value Ref Range    Meter Glucose 94 74 - 99 mg/dL   POCT Glucose   Result Value Ref Range    Meter Glucose 141 (H) 74 - 99 mg/dL   EKG 12 Lead   Result Value Ref Range    Ventricular Rate 71 BPM    Atrial Rate 71 BPM    P-R Interval 124 ms    QRS Duration 76 ms    Q-T Interval 402 ms    QTc Calculation (Bazett) 436 ms    P Axis 52 degrees    R Axis 70 degrees    T Axis 68 degrees   EKG 12 Lead   Result Value Ref Range    Ventricular Rate 72 BPM    Atrial Rate 72 BPM    P-R Interval 132 ms    QRS Duration 82 ms    Q-T Interval 410 ms    QTc Calculation (Bazett) 448 ms    P Axis 66 degrees    R Axis 66 degrees    T Axis 58 degrees       RADIOLOGY:  CT Head WO Contrast   Final Result   No acute intracranial abnormality. Cortical atrophy and periventricular leukomalacia. CT Cervical Spine WO Contrast   Final Result   Minimal multilevel degenerative changes seen within the cervical spine with   no acute abnormality of the cervical spine. XR CHEST PORTABLE   Final Result   No significant interval change with mild compressive left lower lobe   subsegmental atelectasis. EKG:  This EKG is signed and interpreted by me. Rate: 72  Rhythm: Sinus  Interpretation: no acute changes  Comparison: stable as compared to patient's most recent EKG      ------------------------- NURSING NOTES AND VITALS REVIEWED ---------------------------  Date / Time Roomed:  6/20/2022  1:09 PM  ED Bed Assignment:  [id number]    The nursing notes within the ED encounter and vital signs as below have been reviewed.      Patient Vitals for the past 24 hrs:   BP Temp Temp src Pulse Resp SpO2   06/22/22 0815 118/67 98.6 °F (37 °C) Oral 99 16 97 %       Oxygen Saturation Interpretation: Normal    ------------------------------------------ PROGRESS NOTES ------------------------------------------  Re-evaluation(s):   Patients symptoms show no change  Repeat physical examination is not changed    Counseling:  I have spoken with the patient and discussed todays results, in addition to providing specific details for the plan of care and counseling regarding the diagnosis and prognosis. Their questions are answered at this time and they are agreeable with the plan of admission.    --------------------------------- ADDITIONAL PROVIDER NOTES ---------------------------------  Consultations:. Spoke with Dr. Adelaida Adame. Discussed case. They will admit the patient. This patient's ED course included: a personal history and physicial examination, re-evaluation prior to disposition, multiple bedside re-evaluations, IV medications, cardiac monitoring and continuous pulse oximetry    This patient has remained hemodynamically stable during their ED course. Diagnosis:  1. Delirium    2. Fall, initial encounter    3. Accidental drug ingestion, initial encounter        Disposition:  Patient's disposition: Admit   Patient's condition is stable. Attending was present and available throughout encounter including all critical portions; See Attending Note/Attestation for Final [address], DO  Resident  06/20/22 7273      ATTENDING PROVIDER ATTESTATION:     I have personally performed and/or participated in the history, exam, medical decision making, and procedures and agree with all pertinent clinical information unless otherwise noted. I have also reviewed and agree with the past medical, family and social history unless otherwise noted. I have discussed this patient in detail with the resident and provided the instruction and education regarding the evidence-based evaluation and treatment of Deena Faulkner    Patient has been taking more of his medications and has been having hallucinations.  Spoke with family who was at bedside stated that he lives along and they can not care for him. They are requesting that he be placed in a SNF.  Admitted to medicine team for further evlauation    Electronically signed by Yumiko Lane DO on 6/23/22 at 12:06 AM EDT           Yumiko Lane DO  06/23/22 0007

## 2022-06-20 NOTE — ED NOTES
Unable to provide urine sample due to recent incontinence.  Refusing straight cath     Saw Norris RN  06/20/22 1697

## 2022-06-20 NOTE — CARE COORDINATION
He presented to the ED with AMS at home. He normally lives with his wife but she is in rehab currently. He denies a fall but his family think that he has fallen. Family wants him placed at a SNF currently. He may have taken 2 days worth of his meds this morning. The ED will replace his K.     Electronically signed by Karine Sparks DO on 6/20/2022 at 5:52 PM

## 2022-06-21 LAB
ALBUMIN SERPL-MCNC: 3.6 G/DL (ref 3.5–5.2)
ALP BLD-CCNC: 87 U/L (ref 40–129)
ALT SERPL-CCNC: 12 U/L (ref 0–40)
ANION GAP SERPL CALCULATED.3IONS-SCNC: 9 MMOL/L (ref 7–16)
AST SERPL-CCNC: 18 U/L (ref 0–39)
BACTERIA: NORMAL /HPF
BASOPHILS ABSOLUTE: 0.04 E9/L (ref 0–0.2)
BASOPHILS RELATIVE PERCENT: 0.5 % (ref 0–2)
BILIRUB SERPL-MCNC: 0.5 MG/DL (ref 0–1.2)
BILIRUBIN URINE: NEGATIVE
BLOOD, URINE: NEGATIVE
BUN BLDV-MCNC: 13 MG/DL (ref 6–23)
CALCIUM SERPL-MCNC: 8.5 MG/DL (ref 8.6–10.2)
CHLORIDE BLD-SCNC: 107 MMOL/L (ref 98–107)
CLARITY: CLEAR
CO2: 24 MMOL/L (ref 22–29)
COLOR: YELLOW
CREAT SERPL-MCNC: 1.1 MG/DL (ref 0.7–1.2)
EKG ATRIAL RATE: 71 BPM
EKG ATRIAL RATE: 72 BPM
EKG P AXIS: 52 DEGREES
EKG P AXIS: 66 DEGREES
EKG P-R INTERVAL: 124 MS
EKG P-R INTERVAL: 132 MS
EKG Q-T INTERVAL: 402 MS
EKG Q-T INTERVAL: 410 MS
EKG QRS DURATION: 76 MS
EKG QRS DURATION: 82 MS
EKG QTC CALCULATION (BAZETT): 436 MS
EKG QTC CALCULATION (BAZETT): 448 MS
EKG R AXIS: 66 DEGREES
EKG R AXIS: 70 DEGREES
EKG T AXIS: 58 DEGREES
EKG T AXIS: 68 DEGREES
EKG VENTRICULAR RATE: 71 BPM
EKG VENTRICULAR RATE: 72 BPM
EOSINOPHILS ABSOLUTE: 0.12 E9/L (ref 0.05–0.5)
EOSINOPHILS RELATIVE PERCENT: 1.5 % (ref 0–6)
EPITHELIAL CELLS, UA: NORMAL /HPF
FOLATE: 7.7 NG/ML (ref 4.8–24.2)
GFR AFRICAN AMERICAN: >60
GFR NON-AFRICAN AMERICAN: >60 ML/MIN/1.73
GLUCOSE BLD-MCNC: 77 MG/DL (ref 74–99)
GLUCOSE URINE: NEGATIVE MG/DL
HBA1C MFR BLD: 6.3 % (ref 4–5.6)
HCT VFR BLD CALC: 38 % (ref 37–54)
HEMOGLOBIN: 12.2 G/DL (ref 12.5–16.5)
IMMATURE GRANULOCYTES #: 0.04 E9/L
IMMATURE GRANULOCYTES %: 0.5 % (ref 0–5)
KETONES, URINE: NEGATIVE MG/DL
LEUKOCYTE ESTERASE, URINE: NEGATIVE
LYMPHOCYTES ABSOLUTE: 1.83 E9/L (ref 1.5–4)
LYMPHOCYTES RELATIVE PERCENT: 22.4 % (ref 20–42)
MCH RBC QN AUTO: 31 PG (ref 26–35)
MCHC RBC AUTO-ENTMCNC: 32.1 % (ref 32–34.5)
MCV RBC AUTO: 96.4 FL (ref 80–99.9)
METER GLUCOSE: 136 MG/DL (ref 74–99)
METER GLUCOSE: 137 MG/DL (ref 74–99)
METER GLUCOSE: 163 MG/DL (ref 74–99)
METER GLUCOSE: 72 MG/DL (ref 74–99)
MONOCYTES ABSOLUTE: 0.84 E9/L (ref 0.1–0.95)
MONOCYTES RELATIVE PERCENT: 10.3 % (ref 2–12)
NEUTROPHILS ABSOLUTE: 5.29 E9/L (ref 1.8–7.3)
NEUTROPHILS RELATIVE PERCENT: 64.8 % (ref 43–80)
NITRITE, URINE: NEGATIVE
PDW BLD-RTO: 13.8 FL (ref 11.5–15)
PH UA: 5.5 (ref 5–9)
PLATELET # BLD: 190 E9/L (ref 130–450)
PMV BLD AUTO: 11.3 FL (ref 7–12)
POTASSIUM REFLEX MAGNESIUM: 4.7 MMOL/L (ref 3.5–5)
PROTEIN UA: NEGATIVE MG/DL
RBC # BLD: 3.94 E12/L (ref 3.8–5.8)
RBC UA: NORMAL /HPF (ref 0–2)
SODIUM BLD-SCNC: 140 MMOL/L (ref 132–146)
SPECIFIC GRAVITY UA: >=1.03 (ref 1–1.03)
TOTAL PROTEIN: 5.9 G/DL (ref 6.4–8.3)
TSH SERPL DL<=0.05 MIU/L-ACNC: 3.47 UIU/ML (ref 0.27–4.2)
UROBILINOGEN, URINE: 0.2 E.U./DL
VITAMIN B-12: 319 PG/ML (ref 211–946)
WBC # BLD: 8.2 E9/L (ref 4.5–11.5)
WBC UA: NORMAL /HPF (ref 0–5)

## 2022-06-21 PROCEDURE — 82607 VITAMIN B-12: CPT

## 2022-06-21 PROCEDURE — 6360000002 HC RX W HCPCS: Performed by: INTERNAL MEDICINE

## 2022-06-21 PROCEDURE — 82746 ASSAY OF FOLIC ACID SERUM: CPT

## 2022-06-21 PROCEDURE — 84443 ASSAY THYROID STIM HORMONE: CPT

## 2022-06-21 PROCEDURE — 1200000000 HC SEMI PRIVATE

## 2022-06-21 PROCEDURE — 83036 HEMOGLOBIN GLYCOSYLATED A1C: CPT

## 2022-06-21 PROCEDURE — 2580000003 HC RX 258: Performed by: INTERNAL MEDICINE

## 2022-06-21 PROCEDURE — 81001 URINALYSIS AUTO W/SCOPE: CPT

## 2022-06-21 PROCEDURE — 6370000000 HC RX 637 (ALT 250 FOR IP): Performed by: INTERNAL MEDICINE

## 2022-06-21 PROCEDURE — 2500000003 HC RX 250 WO HCPCS: Performed by: INTERNAL MEDICINE

## 2022-06-21 PROCEDURE — 80053 COMPREHEN METABOLIC PANEL: CPT

## 2022-06-21 PROCEDURE — 36415 COLL VENOUS BLD VENIPUNCTURE: CPT

## 2022-06-21 PROCEDURE — 85025 COMPLETE CBC W/AUTO DIFF WBC: CPT

## 2022-06-21 PROCEDURE — 87088 URINE BACTERIA CULTURE: CPT

## 2022-06-21 PROCEDURE — 82962 GLUCOSE BLOOD TEST: CPT

## 2022-06-21 RX ORDER — DEXTROSE, SODIUM CHLORIDE, AND POTASSIUM CHLORIDE 5; .45; .15 G/100ML; G/100ML; G/100ML
INJECTION INTRAVENOUS CONTINUOUS
Status: DISCONTINUED | OUTPATIENT
Start: 2022-06-21 | End: 2022-06-22

## 2022-06-21 RX ADMIN — ASPIRIN 81 MG CHEWABLE TABLET 81 MG: 81 TABLET CHEWABLE at 08:41

## 2022-06-21 RX ADMIN — POTASSIUM CHLORIDE 10 MEQ: 7.46 INJECTION, SOLUTION INTRAVENOUS at 00:32

## 2022-06-21 RX ADMIN — DOCUSATE SODIUM 100 MG: 100 CAPSULE, LIQUID FILLED ORAL at 08:46

## 2022-06-21 RX ADMIN — BUPROPION HYDROCHLORIDE 75 MG: 75 TABLET ORAL at 20:13

## 2022-06-21 RX ADMIN — CITALOPRAM HYDROBROMIDE 20 MG: 20 TABLET ORAL at 08:41

## 2022-06-21 RX ADMIN — DOCUSATE SODIUM 100 MG: 100 CAPSULE, LIQUID FILLED ORAL at 20:12

## 2022-06-21 RX ADMIN — POTASSIUM CHLORIDE 10 MEQ: 7.46 INJECTION, SOLUTION INTRAVENOUS at 04:26

## 2022-06-21 RX ADMIN — Medication 1 TABLET: at 08:41

## 2022-06-21 RX ADMIN — SODIUM CHLORIDE, PRESERVATIVE FREE 10 ML: 5 INJECTION INTRAVENOUS at 08:47

## 2022-06-21 RX ADMIN — INSULIN LISPRO 1 UNITS: 100 INJECTION, SOLUTION INTRAVENOUS; SUBCUTANEOUS at 20:13

## 2022-06-21 RX ADMIN — ACETAMINOPHEN 650 MG: 325 TABLET ORAL at 20:15

## 2022-06-21 RX ADMIN — ENOXAPARIN SODIUM 40 MG: 100 INJECTION SUBCUTANEOUS at 20:30

## 2022-06-21 RX ADMIN — BUPROPION HYDROCHLORIDE 75 MG: 75 TABLET ORAL at 08:41

## 2022-06-21 RX ADMIN — POTASSIUM CHLORIDE, DEXTROSE MONOHYDRATE AND SODIUM CHLORIDE: 150; 5; 450 INJECTION, SOLUTION INTRAVENOUS at 08:52

## 2022-06-21 RX ADMIN — POTASSIUM CHLORIDE 10 MEQ: 7.46 INJECTION, SOLUTION INTRAVENOUS at 03:19

## 2022-06-21 RX ADMIN — MIDODRINE HYDROCHLORIDE 5 MG: 5 TABLET ORAL at 08:40

## 2022-06-21 RX ADMIN — LATANOPROST 1 DROP: 50 SOLUTION OPHTHALMIC at 21:00

## 2022-06-21 RX ADMIN — POTASSIUM CHLORIDE 10 MEQ: 7.46 INJECTION, SOLUTION INTRAVENOUS at 01:31

## 2022-06-21 RX ADMIN — ATORVASTATIN CALCIUM 10 MG: 10 TABLET, FILM COATED ORAL at 08:41

## 2022-06-21 RX ADMIN — Medication 2000 UNITS: at 08:41

## 2022-06-21 RX ADMIN — POTASSIUM CHLORIDE 10 MEQ: 7.46 INJECTION, SOLUTION INTRAVENOUS at 02:23

## 2022-06-21 ASSESSMENT — PAIN SCALES - GENERAL
PAINLEVEL_OUTOF10: 1
PAINLEVEL_OUTOF10: 2
PAINLEVEL_OUTOF10: 0

## 2022-06-21 ASSESSMENT — PAIN DESCRIPTION - LOCATION: LOCATION: GENERALIZED

## 2022-06-21 ASSESSMENT — PAIN DESCRIPTION - DESCRIPTORS: DESCRIPTORS: ACHING

## 2022-06-21 ASSESSMENT — PAIN - FUNCTIONAL ASSESSMENT: PAIN_FUNCTIONAL_ASSESSMENT: ACTIVITIES ARE NOT PREVENTED

## 2022-06-21 NOTE — PROGRESS NOTES
Message sent to Dr. Cinthia Bowers confirming code status and home medications per order. See new orders.

## 2022-06-21 NOTE — H&P
Internal Medicine History & Physical     Name: Tk Reese  : 10/9/1927  Chief Complaint: Drug Overdose (took to much of daily meds out of pill ), Altered Mental Status, Fall (possible), and Other (unable to care for self home alone family want placement.)  Primary Care Physician: Bernardo Mcardle, MD  Admission date: 2022  Date of service: 2022     History of Present Illness  Jet Duenas is a 80y.o. year old male. Presented to the hospital with a chief complaint of altered mental status. The patient states that his wife is in rehab. He states that he was in his \"cellar\". He states that his shoes got tangled and he fell down on Tuesday (Tuesday is 1 week ago). He states that he hurt his left elbow but other than that he felt okay. He got up and was able to function. Nothing in particular seems to make his symptoms better or worse. Overall symptoms are moderate in severity. He denies any other complaints or issues at this time. According to the ED note he took 2 doses of all of his home medications and for this reason his family sent him into the hospital.  They want him to be in rehab while his wife is in rehab as well. There were no family or friends present at bedside. History is provided by the patient. He is felt to be a fair to poor historian. I was able to discuss the case with the ED staff last night and I reviewed his chart. ED course:   Initial blood work and imaging studies performed. Admission recommended by ED physician. Case discussed with ED provider.  Meds in ED consisted of the following: IVF    Past Medical History:   Diagnosis Date    ADD (attention deficit disorder)     Anxiety     B12 deficiency     B12 deficiency     Depression     Diabetes ([address]     Diabetes mellitus ([address]     Hyperlipidemia     Hypertension        Past Surgical History:   Procedure Laterality Date    HAND SURGERY         Family Medical History:  No pertinent family medical history with regard to current issues. Social History  Patient lives at home with his wife but she is in rehab currently. Employment: retired  Illicit drug use- denies  TOBACCO:   reports that he has quit smoking. He has never used smokeless tobacco.  ETOH:   reports no history of alcohol use. Home Medications  Prior to Admission medications    Medication Sig Start Date End Date Taking? Authorizing Provider   midodrine (PROAMATINE) 5 MG tablet Take 5 mg by mouth 2 times daily   Yes Historical Provider, MD   Multiple Vitamins-Minerals (PRESERVISION AREDS) CAPS Take 1 capsule by mouth every morning   Yes Historical Provider, MD   simvastatin (ZOCOR) 20 MG tablet Take 20 mg by mouth every morning     Historical Provider, MD   buPROPion (WELLBUTRIN) 75 MG tablet Take 1 tablet by mouth 2 times daily 3/9/20   Rashaad Russo MD   citalopram (CELEXA) 20 MG tablet Take 1 tablet by mouth daily 3/9/20   Rashaad Russo MD   glimepiride (AMARYL) 4 MG tablet Take 0.5 tablets by mouth every morning (before breakfast) 3/9/20   Christie Brady MD   latanoprost (XALATAN) 0.005 % ophthalmic solution Place 1 drop into the right eye nightly  7/23/19   Historical Provider, MD   docusate sodium (COLACE) 100 MG capsule Take 100 mg by mouth 2 times daily    Historical Provider, MD   aspirin 81 MG chewable tablet Take 81 mg by mouth every morning     Historical Provider, MD   Cholecalciferol (VITAMIN D) 2000 UNITS CAPS capsule Take 2,000 Units by mouth every morning     Historical Provider, MD       Allergies  Allergies   Allergen Reactions    Sulfa Antibiotics Rash     PATIENT TOLERATES AMARYL. Review of Systems:   Please see HPI above. All bolded are positive. All un-bolded are negative.   Constitutional Symptoms: fever, chills, fatigue, generalized weakness, diaphoresis, increase in thirst, loss of appetite  Eyes: vision change   Ears, Nose, Mouth, Throat: hearing loss, nasal congestion, sores in the mouth  Cardiovascular: chest pain, chest heaviness, palpitations  Respiratory: shortness of breath, wheezing, coughing  Gastrointestinal: abdominal pain, nausea, vomiting, diarrhea, constipation, melena, hematochezia, hematemesis  Genitourinary: dysuria, hematuria, increased frequency  Musculoskeletal: lower extremity edema, myalgias, arthralgias, back pain  Integumentary: rashes, itching   Neurological: headache, lightheadedness, dizziness, confusion, syncope, numbness, tingling, focal weakness  Psychiatric: depression, suicidal ideation, anxiety  Endocrine: unintentional weight change  Hematologic/Lymphatic: lymphadenopathy, easy bruising, easy bleeding   Allergic/Immunologic: recurrent infections      Objective  VITALS:  /61   Pulse 91   Temp 98.2 °F (36.8 °C) (Oral)   Resp 18   Ht 6' (1.829 m)   Wt 174 lb (78.9 kg)   SpO2 96%   BMI 23.60 kg/m²     Physical Exam:  General: awake, alert, oriented to person, place, time, and purpose, appears stated age, cooperative, no acute distress, pleasant, appropriate mood  Eyes: conjunctivae/corneas clear, sclera non icteric, EOMI  Ears: no obvious scars, no lesions, no masses, hearing intact  Mouth: mucous membranes moist, no obvious oral sores  Head: normocephalic, atraumatic  Neck: no JVD, no adenopathy, no thyromegaly, neck is supple, trachea is midline  Back: ROM normal, no CVA tenderness.   Chest: no pain on palpation  Lungs: clear to auscultation bilaterally, without rhonchi, crackle, wheezing, or rale, no retractions or use of accessory muscles  Heart: regular rate and regular rhythm, faint systolic murmur, normal S1, S2  Abdomen: soft, non-tender; bowel sounds normal; no masses, no organomegaly  : Deferred   Extremities: no lower extremity edema, bruising noted to the left elbow, no cyanosis, no clubbing, 2+ pedal pulses palpated  Skin: normal color, normal texture, normal turgor, no rashes, no lesions  Neurologic:5/5 muscle strength throughout, normal muscle tone throughout, face symmetric, hearing intact, tongue midline, speech appropriate without slurring, sensation to fine touch intact in upper and lower extremities    Labs-   Lab Results   Component Value Date    WBC 8.2 06/21/2022    HGB 12.2 (L) 06/21/2022    HCT 38.0 06/21/2022     06/21/2022     06/21/2022    K 4.7 06/21/2022     06/21/2022    CREATININE 1.1 06/21/2022    BUN 13 06/21/2022    CO2 24 06/21/2022    GLUCOSE 77 06/21/2022    ALT 12 06/21/2022    AST 18 06/21/2022    INR 1.2 06/20/2022     Lab Results   Component Value Date    TROPONINI <0.01 02/11/2021         Recent Radiological Studies:  CT Head WO Contrast   Final Result   No acute intracranial abnormality. Cortical atrophy and periventricular leukomalacia. CT Cervical Spine WO Contrast   Final Result   Minimal multilevel degenerative changes seen within the cervical spine with   no acute abnormality of the cervical spine. XR CHEST PORTABLE   Final Result   No significant interval change with mild compressive left lower lobe   subsegmental atelectasis. Assessment  Active Hospital Problems    Diagnosis     AMS (altered mental status) [R41.82]      Priority: High    Cataract [H26.9]     Type 2 diabetes mellitus ([address] [E11.9]     Hyperlipidemia [E78.5]     Depression [F32. 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 · HbA1c pending.   · Gentle IVF w/ D5  · Continue home medications other than as noted above. · Follow labs  · DVT prophylaxis. · Please see orders for further management and care. ·  for discharge planning  · Discharge plan: [facility]     Rob Terry DO  6/21/2022  9:32 AM    I can be reached through [employer]. NOTE:  This report was transcribed using voice recognition software. Every effort was made to ensure accuracy; however, inadvertent computerized transcription errors may be present.

## 2022-06-21 NOTE — CARE COORDINATION
Introduced my self and provided explanation of CM role to patient's son and daughter in law via phone. On room visit, patient is altered and cannot participate in functional dc planning discussion. Patient resides at home, wife is currently in [place]. Family requests patient discharge to same facility. If bed not available, next selection is [facility].  Call to [facility] with [facility] and referral made. Will await her review and response regarding bed availability/acceptance. PT/OT pending. Will follow along with  and assist with discharge planning as necessary. The Plan for Transition of Care is related to the following treatment goals: SNF    The Patient and/or patient representative elle Edmondson was provided with a choice of provider and agrees   with the discharge plan. [x] Yes [] No    Freedom of choice list was provided with basic dialogue that supports the patient's individualized plan of care/goals, treatment preferences and shares the quality data associated with the providers. [x] Yes [] No    Sis Cary.  Senthil, MSN, RN  [facility] Case Management  [phone]

## 2022-06-21 NOTE — PLAN OF CARE
Problem: ABCDS Injury Assessment  Goal: Absence of physical injury  Outcome: Progressing     Problem: Safety - Adult  Goal: Free from fall injury  Outcome: Progressing     Problem: Pain  Goal: Verbalizes/displays adequate comfort level or baseline comfort level  Outcome: Progressing

## 2022-06-21 NOTE — PROGRESS NOTES
Spoke with Ceasar and reviewed chart. Per Poison Control they do not feel pt is at a risk of toxicity now and will sign off his case for now.  Electronically signed by Essence Colón RN on 6/21/2022 at 11:12 AM

## 2022-06-21 NOTE — PATIENT CARE CONFERENCE
P Quality Flow/Interdisciplinary Rounds Progress Note        Quality Flow Rounds held on June 21, 2022    Disciplines Attending:  Bedside Nurse, ,  and Nursing Unit Leadership    Matthew Izquierdo was admitted on 6/20/2022  1:09 PM    Anticipated Discharge Date:       Disposition:    Raymundo Score:  Raymundo Scale Score: 20    Readmission Risk              Risk of Unplanned Readmission:  14           Discussed patient goal for the day, patient clinical progression, and barriers to discharge.   The following Goal(s) of the Day/Commitment(s) have been identified:  Diagnostics - Report Results      Srinivasa Scott RN  June 21, 2022

## 2022-06-22 VITALS
BODY MASS INDEX: 23.57 KG/M2 | OXYGEN SATURATION: 97 % | SYSTOLIC BLOOD PRESSURE: 118 MMHG | WEIGHT: 174 LBS | HEART RATE: 99 BPM | HEIGHT: 72 IN | TEMPERATURE: 98.6 F | DIASTOLIC BLOOD PRESSURE: 67 MMHG | RESPIRATION RATE: 16 BRPM

## 2022-06-22 LAB
ALBUMIN SERPL-MCNC: 3.8 G/DL (ref 3.5–5.2)
ALP BLD-CCNC: 91 U/L (ref 40–129)
ALT SERPL-CCNC: 15 U/L (ref 0–40)
ANION GAP SERPL CALCULATED.3IONS-SCNC: 9 MMOL/L (ref 7–16)
AST SERPL-CCNC: 19 U/L (ref 0–39)
BASOPHILS ABSOLUTE: 0.03 E9/L (ref 0–0.2)
BASOPHILS RELATIVE PERCENT: 0.4 % (ref 0–2)
BILIRUB SERPL-MCNC: 0.5 MG/DL (ref 0–1.2)
BUN BLDV-MCNC: 12 MG/DL (ref 6–23)
CALCIUM SERPL-MCNC: 9.1 MG/DL (ref 8.6–10.2)
CHLORIDE BLD-SCNC: 107 MMOL/L (ref 98–107)
CO2: 25 MMOL/L (ref 22–29)
CREAT SERPL-MCNC: 1.2 MG/DL (ref 0.7–1.2)
EOSINOPHILS ABSOLUTE: 0.12 E9/L (ref 0.05–0.5)
EOSINOPHILS RELATIVE PERCENT: 1.6 % (ref 0–6)
GFR AFRICAN AMERICAN: >60
GFR NON-AFRICAN AMERICAN: 56 ML/MIN/1.73
GLUCOSE BLD-MCNC: 111 MG/DL (ref 74–99)
HCT VFR BLD CALC: 38.6 % (ref 37–54)
HEMOGLOBIN: 12.2 G/DL (ref 12.5–16.5)
IMMATURE GRANULOCYTES #: 0.06 E9/L
IMMATURE GRANULOCYTES %: 0.8 % (ref 0–5)
LYMPHOCYTES ABSOLUTE: 1.63 E9/L (ref 1.5–4)
LYMPHOCYTES RELATIVE PERCENT: 22.3 % (ref 20–42)
MCH RBC QN AUTO: 30.6 PG (ref 26–35)
MCHC RBC AUTO-ENTMCNC: 31.6 % (ref 32–34.5)
MCV RBC AUTO: 96.7 FL (ref 80–99.9)
METER GLUCOSE: 141 MG/DL (ref 74–99)
METER GLUCOSE: 94 MG/DL (ref 74–99)
MONOCYTES ABSOLUTE: 0.81 E9/L (ref 0.1–0.95)
MONOCYTES RELATIVE PERCENT: 11.1 % (ref 2–12)
NEUTROPHILS ABSOLUTE: 4.65 E9/L (ref 1.8–7.3)
NEUTROPHILS RELATIVE PERCENT: 63.8 % (ref 43–80)
PDW BLD-RTO: 13.8 FL (ref 11.5–15)
PLATELET # BLD: 192 E9/L (ref 130–450)
PMV BLD AUTO: 10.7 FL (ref 7–12)
POTASSIUM REFLEX MAGNESIUM: 4.9 MMOL/L (ref 3.5–5)
RBC # BLD: 3.99 E12/L (ref 3.8–5.8)
SARS-COV-2, NAAT: NOT DETECTED
SODIUM BLD-SCNC: 141 MMOL/L (ref 132–146)
TOTAL PROTEIN: 6 G/DL (ref 6.4–8.3)
WBC # BLD: 7.3 E9/L (ref 4.5–11.5)

## 2022-06-22 PROCEDURE — 82962 GLUCOSE BLOOD TEST: CPT

## 2022-06-22 PROCEDURE — 2500000003 HC RX 250 WO HCPCS: Performed by: INTERNAL MEDICINE

## 2022-06-22 PROCEDURE — 97161 PT EVAL LOW COMPLEX 20 MIN: CPT

## 2022-06-22 PROCEDURE — 6370000000 HC RX 637 (ALT 250 FOR IP): Performed by: INTERNAL MEDICINE

## 2022-06-22 PROCEDURE — 97165 OT EVAL LOW COMPLEX 30 MIN: CPT

## 2022-06-22 PROCEDURE — 80053 COMPREHEN METABOLIC PANEL: CPT

## 2022-06-22 PROCEDURE — 87635 SARS-COV-2 COVID-19 AMP PRB: CPT

## 2022-06-22 PROCEDURE — 36415 COLL VENOUS BLD VENIPUNCTURE: CPT

## 2022-06-22 PROCEDURE — 85025 COMPLETE CBC W/AUTO DIFF WBC: CPT

## 2022-06-22 RX ADMIN — BUPROPION HYDROCHLORIDE 75 MG: 75 TABLET ORAL at 08:59

## 2022-06-22 RX ADMIN — POTASSIUM CHLORIDE, DEXTROSE MONOHYDRATE AND SODIUM CHLORIDE: 150; 5; 450 INJECTION, SOLUTION INTRAVENOUS at 04:27

## 2022-06-22 RX ADMIN — Medication 1 TABLET: at 08:59

## 2022-06-22 RX ADMIN — INSULIN LISPRO 2 UNITS: 100 INJECTION, SOLUTION INTRAVENOUS; SUBCUTANEOUS at 11:10

## 2022-06-22 RX ADMIN — DOCUSATE SODIUM 100 MG: 100 CAPSULE, LIQUID FILLED ORAL at 08:59

## 2022-06-22 RX ADMIN — ASPIRIN 81 MG CHEWABLE TABLET 81 MG: 81 TABLET CHEWABLE at 08:59

## 2022-06-22 RX ADMIN — CITALOPRAM HYDROBROMIDE 20 MG: 20 TABLET ORAL at 09:00

## 2022-06-22 RX ADMIN — Medication 2000 UNITS: at 09:00

## 2022-06-22 RX ADMIN — ATORVASTATIN CALCIUM 10 MG: 10 TABLET, FILM COATED ORAL at 08:59

## 2022-06-22 NOTE — PROGRESS NOTES
Called nurse to nurse and faxed paperwork to [employer] at [facility] MOHINDER HANCOCK RN 11:47 AM

## 2022-06-22 NOTE — PROGRESS NOTES
Physician Progress Note      Caterina Membreno  CSN #:                  [id number]  :                       10/9/1927  ADMIT DATE:       2022 1:09 PM  [address] [place] DATE:  RESPONDING  PROVIDER #:        Cesilia Henley DO          QUERY TEXT:    Patient admitted with AMS, noted to have taken too many meds at home. If   possible, please document in progress notes and discharge summary if you are   evaluating and/or treating any of the following: The medical record reflects the following:  Risk Factors: advanced age, lives at home alone with wife currently at rehab  Clinical Indicators: per ED \". ..increased confusion worsening with concern for   fall today. .  Daughter-in-law reports associated symptoms of left arm wound. Patient in no acute distress. Daughter in law reports that took 2 days worth of daily pills, daughter in law   states between 9 Pm and 9 Am this morning was possible ingestion time. Vilma Esparza Dye \",   per IM \". .. Hold Amaryl-patient accidentally took 2 pills on . Vilma Dye Vimla Dye \"  Treatment: Poison Control, SHARON on dc    Thank you,  April Jaja Andre, RN, BSN, CDIS  Clinical Documentation Improvement  [email or web address]  Options provided:  -- Accidental overdose of home medications (including Amaryl)  -- Other cause of AMS, please specify, please specify. -- Other - I will add my own diagnosis  -- Disagree - Not applicable / Not valid  -- Disagree - Clinically unable to determine / Unknown  -- Refer to Clinical Documentation Reviewer    PROVIDER RESPONSE TEXT:    This patient had an accidental overdose of home medications (including   Amaryl).     Query created by: Tuyet Godwin on 2022 10:30 AM      Electronically signed by:  Cesilia Henley DO 2022 10:35 AM

## 2022-06-22 NOTE — PROGRESS NOTES
Internal Medicine Progress Note    Patient's name: Phillip Daniel  : 10/9/1927  Chief complaints (on day of admission): Drug Overdose (took to much of daily meds out of pill ), Altered Mental Status, Fall (possible), and Other (unable to care for self home alone family want placement.)  Admission date: 2022  Date of service: 2022   Room: [address] SURG  Primary care physician: Yolanda Romeo MD  Reason for visit: Follow-up for AMS    Subjective  Albina Camp was seen and examined. Sitting up in a chair in his room. He told me that he is feeling better today. He does not feel confused. He denies new complaints or issues. He is comfortable going to the rehab facility that his wife is currently residing at. Review of Systems  There are no new complaints of chest pain, shortness of breath, abdominal pain, nausea, vomiting, diarrhea, constipation.     Hospital Medications  Current Facility-Administered Medications   Medication Dose Route Frequency Provider Last Rate Last Admin    dextrose 5 % and 0.45 % NaCl with KCl 20 mEq infusion   IntraVENous Continuous Rob Terry, DO 50 mL/hr at 22 0427 New Bag at 22 0427    aspirin chewable tablet 81 mg  81 mg Oral QAM Rob Terry, DO   81 mg at 22 0859    vitamin D (CHOLECALCIFEROL) tablet 2,000 Units  2,000 Units Oral QAM Rob Terry, DO   2,000 Units at 22 0900    docusate sodium (COLACE) capsule 100 mg  100 mg Oral BID Rob Terry, DO   100 mg at 22 0859    latanoprost (XALATAN) 0.005 % ophthalmic solution 1 drop  1 drop Right Eye Nightly Rob Hurtino, DO   1 drop at 22 2100    buPROPion (WELLBUTRIN) tablet 75 mg  75 mg Oral BID Rob Terry, DO   75 mg at 22 0859    citalopram (CELEXA) tablet 20 mg  20 mg Oral Daily Rob Terry, DO   20 mg at 22 0900    midodrine (PROAMATINE) tablet 5 mg  5 mg Oral BID Rob Terry, DO   5 mg at 22 0840    atorvastatin (LIPITOR) tablet 10 mg  10 mg Oral Daily Rob E Volino, DO   10 mg at 06/22/22 0859    insulin lispro (HUMALOG) injection vial 0-12 Units  0-12 Units SubCUTAneous TID WC Rob JIMENEZ Volino, DO        insulin lispro (HUMALOG) injection vial 0-6 Units  0-6 Units SubCUTAneous Nightly Rob JIMENEZ Volino, DO   1 Units at 06/21/22 2013    glucose chewable tablet 16 g  4 tablet Oral PRN Rob E Volino, DO        dextrose bolus 10% 125 mL  125 mL IntraVENous PRN Rob E Volino, DO        Or    dextrose bolus 10% 250 mL  250 mL IntraVENous PRN Rob E Volino, DO        glucagon (rDNA) injection 1 mg  1 mg IntraMUSCular PRN Rob E Volino, DO        dextrose 5 % solution  100 mL/hr IntraVENous PRN Rob E Volino, DO        sodium chloride flush 0.9 % injection 10 mL  10 mL IntraVENous 2 times per day Rob E Volino, DO   10 mL at 06/21/22 0847    sodium chloride flush 0.9 % injection 10 mL  10 mL IntraVENous PRN Rob E Volino, DO        0.9 % sodium chloride infusion   IntraVENous PRN Rob E Volino,  mL/hr at 06/21/22 0506 Rate Verify at 06/21/22 0506    potassium chloride (KLOR-CON M) extended release tablet 40 mEq  40 mEq Oral PRN Rob E Volino, DO        Or    potassium bicarb-citric acid (EFFER-K) effervescent tablet 40 mEq  40 mEq Oral PRN Rob E Volino, DO        Or    potassium chloride 10 mEq/100 mL IVPB (Peripheral Line)  10 mEq IntraVENous PRN Rob E Volino,  mL/hr at 06/21/22 0506 Rate Verify at 06/21/22 0506    enoxaparin (LOVENOX) injection 40 mg  40 mg SubCUTAneous Daily Rob E Volino, DO   40 mg at 06/21/22 2030    ondansetron (ZOFRAN-ODT) disintegrating tablet 4 mg  4 mg Oral Q8H PRN Rob E Volino, DO        Or    ondansetron (ZOFRAN) injection 4 mg  4 mg IntraVENous Q6H PRN Rob E Volino, DO        senna (SENOKOT) tablet 8.6 mg  1 tablet Oral Daily PRN Rob E Volino, DO        acetaminophen (TYLENOL) tablet 650 mg  650 mg Oral Q6H PRN Rob E Volino, DO   650 mg at 06/21/22 2015    Or    acetaminophen (TYLENOL) suppository 650 mg  650 mg Rectal Q6H PRN Rob Terry DO        antioxidant multivitamin (OCUVITE) tablet  1 tablet Oral Daily Rob Terry DO   1 tablet at 06/22/22 0859       PRN Medications  glucose, dextrose bolus **OR** dextrose bolus, glucagon (rDNA), dextrose, sodium chloride flush, sodium chloride, potassium chloride **OR** potassium alternative oral replacement **OR** potassium chloride, ondansetron **OR** ondansetron, senna, acetaminophen **OR** acetaminophen    Objective  Most Recent Recorded Vitals  /67   Pulse 99   Temp 98.6 °F (37 °C) (Oral)   Resp 16   Ht 6' (1.829 m)   Wt 174 lb (78.9 kg)   SpO2 97%   BMI 23.60 kg/m²   I/O last 3 completed shifts: In: 1324.3 [P.O.:240; I.V.:553.1; IV Piggyback:531.2]  Out: 400 [Urine:400]  No intake/output data recorded. Physical Exam:  General: AAO to person/place/time/purpose, NAD, no labored breathing  Eyes: conjunctivae/corneas clear, sclera non icteric  Skin: color/texture/turgor normal, no rashes or lesions  Lungs: CTAB, no retractions/use of accessory muscles, no vocal fremitus, no rhonchi, no crackle, no rales  Heart: regular rate, regular rhythm, faint systolic murmur  Abdomen: soft, NT, bowel sounds normal  Extremities: atraumatic, no edema  Neurologic: cranial nerves 2-12 grossly intact, no slurred speech    Most Recent Labs  Lab Results   Component Value Date    WBC 7.3 06/22/2022    HGB 12.2 (L) 06/22/2022    HCT 38.6 06/22/2022     06/22/2022     06/22/2022    K 4.9 06/22/2022     06/22/2022    CREATININE 1.2 06/22/2022    BUN 12 06/22/2022    CO2 25 06/22/2022    GLUCOSE 111 (H) 06/22/2022    ALT 15 06/22/2022    AST 19 06/22/2022    INR 1.2 06/20/2022    TSH 3.470 06/21/2022    LABA1C 6.3 (H) 06/21/2022       CT Head WO Contrast   Final Result   No acute intracranial abnormality. Cortical atrophy and periventricular leukomalacia.          CT Cervical Spine WO Contrast   Final

## 2022-06-22 NOTE — DISCHARGE SUMMARY
Internal Medicine Discharge Summary    NAME: Tomas Snow :  10/9/1927  MRN:  [id number] Tan Silva MD    ADMITTED: 2022   DISCHARGED: 2022  3:46 PM    ADMITTING PHYSICIAN: Michelle Wu DO    PCP: Rashaad Russo MD    CONSULTANT(S):   IP CONSULT TO INTERNAL MEDICINE  IP CONSULT TO SOCIAL WORK     ADMITTING DIAGNOSIS:   AMS (altered mental status) [R41.82]     Please see H&P for further details    DISCHARGE DIAGNOSES:   Active Hospital Problems    Diagnosis     AMS (altered mental status) [R41.82]      Priority: High    Cataract [H26.9]     Type 2 diabetes mellitus ([address] [E11.9]     Hyperlipidemia [E78.5]     Depression [F32. A]     B12 deficiency [E53.8]        BRIEF HISTORY OF PRESENT ILLNESS: Tomas Snow is a 80 y.o. male patient of Rashaad Russo MD who  has a past medical history of ADD (attention deficit disorder), Anxiety, B12 deficiency, B12 deficiency, Depression, Diabetes ([address] Diabetes mellitus ([address] Hyperlipidemia, and Hypertension. who originally had concerns including Drug Overdose (took to much of daily meds out of pill ), Altered Mental Status, Fall (possible), and Other (unable to care for self home alone family want placement. ). at presentation on 2022, and was found to have AMS (altered mental status) [R41.82] after workup. Please see H&P for further details. HOSPITAL COURSE:   The patient presented to the hospital with the chief complaint of Drug Overdose (took to much of daily meds out of pill ), Altered Mental Status, Fall (possible), and Other (unable to care for self home alone family want placement. ). The patient was admitted to the hospital.     · Generalized weakness, failure to thrive, mild confusion:  ? His wife is currently in a SNF  ? PT/OT evaluations  · Hypoglycemia (due to taking too many meds) w/ h/o DM:   ? Hold Amaryl-patient accidentally took 2 pills on   ? SSI.   ? HbA1c 6.3%. ?  Gentle IVF w/ D5 stopped    DC'ed to [facility]      BRIEF PHYSICAL EXAMINATION AND LABORATORIES ON DAY OF DISCHARGE:  VITALS:  /67   Pulse 99   Temp 98.6 °F (37 °C) (Oral)   Resp 16   Ht 6' (1.829 m)   Wt 174 lb (78.9 kg)   SpO2 97%   BMI 23.60 kg/m²     Please see note from the same day. LABS[de-identified]  Recent Labs     06/20/22  1346 06/20/22  1613 06/21/22  0600 06/22/22  0540     --  140 141   K 5.6* 2.8* 4.7 4.9     --  107 107   CO2 23  --  24 25   BUN 17  --  13 12   CREATININE 1.1  --  1.1 1.2   GLUCOSE 103*  --  77 111*   CALCIUM 8.9  --  8.5* 9.1     Recent Labs     06/20/22  1346 06/21/22  0600 06/22/22  0540   ALKPHOS 88 87 91   ALT 16 12 15   AST 30 18 19   PROT 6.7 5.9* 6.0*   BILITOT 0.4 0.5 0.5   LABALBU 3.8 3.6 3.8     Recent Labs     06/20/22  1346 06/21/22  0600 06/22/22  0540   WBC 8.6 8.2 7.3   RBC 4.25 3.94 3.99   HGB 13.1 12.2* 12.2*   HCT 41.2 38.0 38.6   MCV 96.9 96.4 96.7   MCH 30.8 31.0 30.6   MCHC 31.8* 32.1 31.6*   RDW 14.1 13.8 13.8    190 192   MPV 11.0 11.3 10.7     Lab Results   Component Value Date    LABA1C 6.4 (H) 06/23/2022    LABA1C 6.3 (H) 06/21/2022    LABA1C 6.0 (H) 04/26/2021     Lab Results   Component Value Date    INR 1.2 06/20/2022    PROTIME 13.1 (H) 06/20/2022      Lab Results   Component Value Date    TSH 3.470 06/21/2022     Lab Results   Component Value Date    TRIG 67 06/23/2022    TRIG 86 04/26/2021    TRIG 71 06/08/2018    HDL 47 06/23/2022    HDL 62 04/26/2021    HDL 52 06/08/2018    LDLCALC 72 06/23/2022    LDLCALC 62 04/26/2021    LDLCALC 68 06/08/2018     No results for input(s): MG in the last 72 hours. No results for input(s): CKTOTAL, CKMB, TROPONINI in the last 72 hours. No results for input(s): LACTA in the last 72 hours.     IMAGING:  CT Head WO Contrast    Result Date: 6/20/2022  EXAMINATION: CT OF THE HEAD WITHOUT CONTRAST  6/20/2022 2:25 pm TECHNIQUE: CT of the head was performed without the administration of intravenous contrast. Automated exposure control, iterative reconstruction, and/or weight based adjustment of the mA/kV was utilized to reduce the radiation dose to as low as reasonably achievable. COMPARISON: February 11, 2021 HISTORY: ORDERING SYSTEM PROVIDED HISTORY: fall TECHNOLOGIST PROVIDED HISTORY: Reason for exam:->fall Has a \"code stroke\" or \"stroke alert\" been called? ->No Decision Support Exception - unselect if not a suspected or confirmed emergency medical condition->Emergency Medical Condition (MA) FINDINGS: BRAIN/VENTRICLES: There is no acute intracranial hemorrhage, mass effect or midline shift. No abnormal extra-axial fluid collection. The gray-white differentiation is maintained without evidence of an acute infarct. There is no evidence of hydrocephalus. ORBITS: The visualized portion of the orbits demonstrate no acute abnormality. SINUSES: The visualized paranasal sinuses and mastoid air cells demonstrate no acute abnormality. SOFT TISSUES/SKULL:  No acute abnormality of the visualized skull or soft tissues. No acute intracranial abnormality. Cortical atrophy and periventricular leukomalacia. CT Cervical Spine WO Contrast    Result Date: 6/20/2022  EXAMINATION: CT OF THE CERVICAL SPINE WITHOUT CONTRAST 6/20/2022 2:25 pm TECHNIQUE: CT of the cervical spine was performed without the administration of intravenous contrast. Multiplanar reformatted images are provided for review. Automated exposure control, iterative reconstruction, and/or weight based adjustment of the mA/kV was utilized to reduce the radiation dose to as low as reasonably achievable. COMPARISON: None. HISTORY: ORDERING SYSTEM PROVIDED HISTORY: fall TECHNOLOGIST PROVIDED HISTORY: Reason for exam:->fall Decision Support Exception - unselect if not a suspected or confirmed emergency medical condition->Emergency Medical Condition (MA) FINDINGS: BONES/ALIGNMENT: There is no acute fracture or traumatic malalignment. The lateral masses are well aligned.   The odontoid tip is unremarkable. DEGENERATIVE CHANGES: Multilevel degenerative changes seen within the cervical spine most pronounced within the posterior facets left greater than right. There is mild anterior spurring identified of the cervical spine SOFT TISSUES: There is no prevertebral soft tissue swelling. Vascular calcifications seen in the carotid bifurcations bilaterally. Minimal multilevel degenerative changes seen within the cervical spine with no acute abnormality of the cervical spine. XR CHEST PORTABLE    Result Date: 6/20/2022  EXAMINATION: ONE XRAY VIEW OF THE CHEST 6/20/2022 1:43 pm COMPARISON: 14 February 2021 HISTORY: ORDERING SYSTEM PROVIDED HISTORY: fall TECHNOLOGIST PROVIDED HISTORY: Reason for exam:->fall FINDINGS: Single AP erect portable chest demonstrates moderate elevation left hemidiaphragm with increased markings secondary to compressive subsegmental atelectasis. There is mild shift of the mediastinum to the right. The right lung is clear and there is no evidence of a pneumothorax. The cardiac silhouette appears unremarkable. No significant interval change with mild compressive left lower lobe subsegmental atelectasis. DISPOSITION:  The patient's condition is fair. The patient is being discharged to nursing home    DISCHARGE MEDICATIONS:      Medication List      CHANGE how you take these medications    midodrine 5 MG tablet  Commonly known as: PROAMATINE  What changed: Another medication with the same name was removed. Continue taking this medication, and follow the directions you see here.         CONTINUE taking these medications    aspirin 81 MG chewable tablet     buPROPion 75 MG tablet  Commonly known as: WELLBUTRIN  Take 1 tablet by mouth 2 times daily     citalopram 20 MG tablet  Commonly known as: CELEXA  Take 1 tablet by mouth daily     docusate sodium 100 MG capsule  Commonly known as: COLACE     glimepiride 4 MG tablet  Commonly known as: AMARYL  Take 0.5 tablets by mouth every morning (before breakfast)     latanoprost 0.005 % ophthalmic solution  Commonly known as: XALATAN     PreserVision AREDS Caps     simvastatin 20 MG tablet  Commonly known as: ZOCOR     vitamin D 50 MCG (2000 UT) Caps capsule              INTERNAL MEDICINE INSTRUCTIONS:  · Follow-up with primary care physician as directed in discharge paperwork. · Please review results of imaging studies with PCP. · Follow-up with consultants as directed by them. · If recurrence or worsening of symptoms go to the ED or call primary care physician. · Diet: ADULT DIET; Regular; 4 carb choices (60 gm/meal)    FOLLOW-IP  Rajesh Tierney, [address] [place] [id number] [phone]    Schedule an appointment as soon as possible for a visit in 1 week  for follow-up appointment from this hospital stay after DC from [facility] at [address] [facility] [address] [place] [address]  [phone]          Preparing for this patient's discharge, including paperwork, orders, instructions, and meeting with patient did required 34 minutes.     Electronically signed by Mayuri Peterson DO on 6/27/2022 at 8:07 AM

## 2022-06-22 NOTE — PATIENT CARE CONFERENCE
P Quality Flow/Interdisciplinary Rounds Progress Note        Quality Flow Rounds held on June 22, 2022    Disciplines Attending:  Bedside Nurse, ,  and Nursing Unit Leadership    Erika Barriga was admitted on 6/20/2022  1:09 PM    Anticipated Discharge Date:  Expected Discharge Date: 06/23/22    Disposition:    Raymundo Score:  Raymundo Scale Score: 20    Readmission Risk              Risk of Unplanned Readmission:  12           Discussed patient goal for the day, patient clinical progression, and barriers to discharge.   The following Goal(s) of the Day/Commitment(s) have been identified:  placement      Jovanny Lau RN  June 22, 2022

## 2022-06-22 NOTE — DISCHARGE INSTR - COC
Continuity of Care Form    Patient Name: Massiel Kent   :  10/9/1927  MRN:  [id number]    Admit date:  2022  Discharge date:  22    Code Status Order: DNR-CCA   Advance Directives:      Admitting Physician:  Sven Gonzalez DO  PCP: Ricci Stafford MD    Discharging Nurse: [facility] Unit/Room#: 3969/7060-I  Discharging Unit Phone Number: [phone]    Emergency Contact:   Extended Emergency Contact Information  Primary Emergency Contact: Trisha Brothers  Address: [address] [place] [address], [address] [place] [address] Phone: [phone]  Relation: Child  Secondary Emergency Contact: Tyrone Phone: [phone]  Relation: Other    Past Surgical History:  Past Surgical History:   Procedure Laterality Date    HAND SURGERY         Immunization History:   Immunization History   Administered Date(s) Administered    COVID-19, Pfizer Purple top, DILUTE for use, 12+ yrs, 30mcg/0.3mL dose 2021    Influenza, High Dose (Fluzone 65 yrs and older) 2015, 2016, 10/06/2017, 10/03/2018, 2019    Pneumococcal Conjugate 13-valent (Nolon Sharif) 2015    Tdap (Boostrix, Adacel) 2021       Active Problems:  Patient Active Problem List   Diagnosis Code    Hyperlipidemia E78.5    Depression F32. 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    Multiple lung nodules R91.8    Cataract H26.9    AMS (altered mental status) R41.82       Isolation/Infection:   Isolation            No Isolation          Patient Infection Status       Infection Onset Added Last Indicated Last Indicated By Review Planned Expiration Resolved Resolved By    None active    Resolved    COVID-19 (Rule Out) 02/15/21 02/15/21 02/15/21 COVID-19, Rapid (Ordered)   02/15/21 Rule-Out Test Resulted            Nurse Assessment:  Last Vital Signs: /67   Pulse 99   Temp 98.6 °F (37 °C) (Oral)   Resp 16   Ht 6' (1.829 m)   [employer] 174 lb (78.9 kg)   SpO2 97%   BMI 23.60 kg/m²     Last documented pain score (0-10 scale): Pain Level: 1  Last Weight:   Wt Readings from Last 1 Encounters:   06/21/22 174 lb (78.9 kg)     Mental Status:  disoriented    IV Access:  - None    Nursing Mobility/ADLs:  Walking   Assisted  Transfer  Assisted  Bathing  Assisted  Dressing  Assisted  Toileting  Assisted  Feeding  Independent  Med Admin  Independent  Med Delivery   whole    Wound Care Documentation and Therapy:  Wound 02/11/21 Elbow Right;Posterior (Active)   Number of days: 495        Elimination:  Continence: Bowel: No  Bladder: No  Urinary Catheter: None   Colostomy/Ileostomy/Ileal Conduit: No       Date of Last BM: 6/21    Intake/Output Summary (Last 24 hours) at 6/22/2022 1018  Last data filed at 6/22/2022 0004  Gross per 24 hour   Intake 120 ml   Output --   Net 120 ml     I/O last 3 completed shifts: In: 1324.3 [P.O.:240; I.V.:553.1; IV Piggyback:531.2]  Out: 400 [Urine:400]    Safety Concerns: At Risk for Falls    Impairments/Disabilities:      None    Nutrition Therapy:  Current Nutrition Therapy:   - Oral Diet:  Carb Control 4 carbs/meal (1800kcals/day)    Routes of Feeding: Oral  Liquids: No Restrictions  Daily Fluid Restriction: no  Last Modified Barium Swallow with Video (Video Swallowing Test): not done    Treatments at the Time of Hospital Discharge:   Respiratory Treatments: ***  Oxygen Therapy:  is not on home oxygen therapy.   Ventilator:    - No ventilator support    Rehab Therapies: Physical Therapy and Occupational Therapy  Weight Bearing Status/Restrictions: No weight bearing restrictions  Other Medical Equipment (for information only, NOT a DME order):  walker  Other Treatments: ***    Patient's personal belongings (please select all that are sent with patient):  None    RN SIGNATURE:  Electronically signed by Rosalina Pagan RN on 6/22/22 at 11:34 AM EDT    CASE MANAGEMENT/SOCIAL WORK SECTION    Inpatient Status Date: 6/20/2022    Readmission Risk Assessment Score:  Readmission Risk              Risk of Unplanned Readmission:  12           Discharging to Facility/ Agency   Name: [facility] at [address] [facility] [address] [place] [address]         Phone: [phone]       Fax: [phone]          Dialysis Facility (if applicable)   Name:  Address:  Dialysis Schedule:  Phone:  Fax:    / signature: Electronically signed by Shelbi Kohler RN on 6/22/22 at 11:11 AM EDT    PHYSICIAN SECTION    Prognosis: Good    Condition at Discharge: Stable    Rehab Potential (if transferring to Rehab): Fair    Recommended Labs or Other Treatments After Discharge: none    Physician Certification: I certify the above information and transfer of Tk Reese  is necessary for the continuing treatment of the diagnosis listed and that he requires Alcides Stephen for less 30 days.      Update Admission H&P: No change in H&P    PHYSICIAN SIGNATURE:  Electronically signed by Ascencion Colon DO on 6/22/22 at 10:18 AM EDT

## 2022-06-22 NOTE — CARE COORDINATION
Received notification from [address] at [facility] that facility can accept patient for SNF stay today. Non emergency transportation has been arranged with Marcell Daniels w/[facility],  time 2PM.  Patient, facility liaison and bedside nurse notified of scheduled  time. Envelope prepared with demo's, ambulance form and 10167 - placed in lite chart     Patient is appropriate for SNF level of care and has been accepted. Using the authority under Section [phone] (f) of the Act, CMS is waiving the requirement for a 3-day prior hospitalization for coverage of a SNF stay, which provides temporary emergency coverage of SNF services without a qualifying hospital stay, for those people who experience dislocations, or are otherwise affected by COVID-19. Shira Ho.  Senthil, MSN, RN  [facility] Case Management  [phone]

## 2022-06-22 NOTE — PROGRESS NOTES
Physical Therapy  Facility/Department: [facility] MED SURG  Physical Therapy Initial Assessment    Name: Matthew Izquierdo  : 10/9/1927  MRN: [id number]  Date of Service: 2022      Attending Provider:  Giuliana Torres DO    Evaluating PT:  Brady Villanueva, P.T. Room #:  0282/6204-H  Diagnosis:  AMS (altered mental status) [R41.82]  Precautions:  Falls, bed/chair alarm, mild confusion  Equipment Needs:  Wheeled walker    SUBJECTIVE:    Pt lives with wife (per chart she is in [place] currently) in a 2 story home with 2 stairs and 1 rail to enter. There are 4 steps and 1 rail to 2nd floor bed and bath. Pt ambulated with no AD PTA. Above information was obtained from pt. OBJECTIVE:   Initial Evaluation  Date: 22 Treatment Short Term/ Long Term   Goals   Was pt agreeable to Eval/treatment? yes     Does pt have pain? No c/o pain     Bed Mobility  Rolling: SBA  Supine to sit: SBA  Sit to supine: SBA  Scooting: SBA  Independent    Transfers Sit to stand: MIN A  Stand to sit: MIN A  Stand pivot: MOD A with no AD and MIN A with ww  supervision   Ambulation   3+15 feet with no AD MOD A and 30 feet with ww MIN A  200 feet with ww SBA   Stair negotiation: ascended and descended NA  8 steps with 1 rail SBA   AM-PAC 6 Clicks 36/69       BLE ROM is WFL. BLE strength is grossly 4-/5 to 4/5.    Sensation:  Pt denies numbness and tingling to extremities  Edema:  None noted  Balance: sitting is supervision and standing with no AD is MIN A/MOD A and with ww is SBA/CGA  Endurance: fair    Patient education  Pt educated on gait with ww    Patient response to education:   Pt verbalized understanding Pt demonstrated skill Pt requires further education in this area   yes yes yes     ASSESSMENT:    Conditions Requiring Skilled Therapeutic Intervention:    [x]Decreased strength     []Decreased ROM  [x]Decreased functional mobility  [x]Decreased balance   [x]Decreased endurance   []Decreased posture  []Decreased risk  [x] Endurance Training to improve activity tolerance during functional mobility   [x] Transfer Training to improve safety and independence with all functional transfers   [x] Gait Training to improve gait mechanics, endurance and assess need for appropriate assistive device  [x] Stair Training in preparation for safe discharge home and/or into the community   [] Positioning to prevent skin breakdown and contractures  [] Safety and Education Training   [x] Patient/Caregiver Education   [] HEP  [] Other     PT long term treatment goals are located in above grid    Frequency of treatments: 2-5x/week x 1-2 weeks. Time in  08:45  Time out  09:05    Evaluation Time includes thorough review of current medical information, gathering information on past medical history/social history and prior level of function, completion of standardized testing/informal observation of tasks, assessment of data and education on plan of care and goals. CPT codes:  [x] Low Complexity PT evaluation 20294  [] Moderate Complexity PT evaluation 58935  [] High Complexity PT evaluation 59494  [] PT Re-evaluation 74055  [] Gait training 84486 ** minutes  [] Manual therapy 72175 ** minutes  [] Therapeutic activities 45398 ** minutes  [] Therapeutic exercises 83487 ** minutes  [] Neuromuscular reeducation 80204 ** minutes     Kole Osorio ., P.T.   License Number: [id number]

## 2022-06-22 NOTE — PROGRESS NOTES
Occupational Therapy  OCCUPATIONAL THERAPY INITIAL EVALUATION    [facility] [place] [facility], [place]        Date:2022                                                  Patient Name: Mary Mullen    MRN: [id number]    : 10/9/1927    Room: [address] [place] [address]      Evaluating OT: Kiesha Coleman, OTR/L [id number]      Referring Provider: Jai Raygoza DO    Specific Provider Orders/Date: OT eval and treat 22      Diagnosis: AMS (altered mental status) [R41.82]      Pertinent Medical History: DM, HTN       Precautions:  Fall Risk, alarm     Assessment of current deficits    [x] Functional mobility  [x]ADLs  [x] Strength               [x]Cognition    [x] Functional transfers   [x] IADLs         [x] Safety Awareness   [x]Endurance    [] Fine Coordination              [x] Balance      [] Vision/perception   []Sensation     []Gross Motor Coordination  [] ROM  [] Delirium                   [] Motor Control     OT PLAN OF CARE   OT POC based on physician orders, patient diagnosis and results of clinical assessment    Frequency/Duration 2-4 days/wk for 2 weeks PRN   Specific OT Treatment Interventions to include:   * Instruction/training on adapted ADL techniques and AE recommendations to increase functional independence within precautions       * Training on energy conservation strategies, correct breathing pattern and techniques to improve independence/tolerance for self-care routine  * Functional transfer/mobility training/DME recommendations for increased independence, safety, and fall prevention  * Patient/Family education to increase follow through with safety techniques and functional independence  * Recommendation of environmental modifications for increased safety with functional transfers/mobility and ADLs  * Cognitive retraining/development of therapeutic activities to improve problem solving, judgement, memory, and attention for increased safety/participation in ADL/IADL tasks  * Therapeutic exercise to improve motor endurance, ROM, and functional strength for ADLs/functional transfers  * Therapeutic activities to facilitate/challenge dynamic balance, stand tolerance for increased safety and independence with ADLs        Recommended Adaptive Equipment: TBD     Home Living: Pt lives with wife (currently in SNF) in 2 story house with 2 WALKER and 1 hand rail . Pt's bedroom and bathroom are on the 2nd floor. Bathroom setup: walk in shower with shower chair, grab bars, and hand held shower head; standard height commode   Equipment owned: none    Prior Level of Function: assist from wife with ADLs , assist from wife and family with IADLs; functional mobility:  Driving: no (family assists)    Pain Level: pt did not report pain this date; pt agreeable to therapy  Cognition: A&O: 4/4; 1 step command follow demonstrated. Confusion noted but pt pleasant throughout session.    Memory:  Fair    Sequencing:  Fair-   Problem solving:  Fair-   Judgement/safety:  Fair-     Functional Assessment:  AM-PAC Daily Activity Raw Score: 16/24   Initial Eval Status  Date: 6/22/22 Treatment Status  Date: STGs = LTGs  Time frame: 10-14 days   Feeding Set up     Grooming CGA  To complete hand hygiene and rinse mouth with mouthwash standing at sink  Sup   UB Dressing Min A  For gown management  Sup   LB Dressing Mod A  To adjust socks and don/doff brief   SBA-with use of AD as appropriate/needed   Bathing Mod A  SBA -with use of AD as appropriate/needed   Toileting Min A  For clothing management and pericare  Sup    Bed Mobility   Sit to supine: SBA   Sup/I   Functional Transfers Min A with wheeled walker  Sit to stand from chair  Sit<>stand from commode  Stand to sit to EOB  Cues for hand placement and safe technique  Sup    Functional Mobility Min A with wheeled walker  To and from bathroom  Cues and assist for safe wheeled walker management and navigation  Assist to manage lines. Some unsteadiness noted. SBA -with device as needed to maximize independence with ADLs and functional task completion   Balance Sitting:     Static:  SBA    Dynamic:SBA  Standing: CGA with wheeled walker  Sup for static/dynamic sitting balance to maximize independence with ADLs and functional task completion    Sup for standing  balance to maximize independence with ADLs and functional task completion   Activity Tolerance Fair with light activity. Pt limited by fatigue and weakness. Good with ADL activity. Visual/  Perceptual Glasses: none at bedside                Additional long-term goal: Pt will increase functional independence to PLOF to allow pt to live in least restrictive environment. Hand Dominance R   AROM (PROM) Strength Additional Info:    RUE  WFL 4-/5 good  and wfl FMC/dexterity noted during ADL tasks       LUE WFL 4-/5 good  and wfl FMC/dexterity noted during ADL tasks       Hearing: WFL   Sensation:  No c/o numbness or tingling   Tone: WFL   Edema: none noted    Comments: Upon arrival patient sitting in chair. At end of session, patient lying in bed with call light and phone within reach, all lines and tubes intact, and alarm set. Overall patient demonstrated decreased independence and safety during completion of ADL/functional transfer/mobility tasks. Pt would benefit from continued skilled OT to increase safety and independence with completion of ADL/IADL tasks for functional independence and quality of life. Rehab Potential: Good for established goals     Patient / Family Goal: none stated    Patient and/or family were instructed on functional diagnosis, prognosis/goals and OT plan of care. Demonstrated fair understanding.      Eval Complexity: Low    Time In: 2910  Time Out: 1045    Min Units   OT Eval Low 97165  x  1   OT Eval Medium 93496      OT Eval High [id number]      OT Re-Eval [id number]       Therapeutic Ex 54372       Therapeutic Activities 90137       ADL/Self Care 70830       Orthotic Management 62506       Manual [id number]     Neuro Re-Ed 71267       Non-Billable Time          Evaluation Time additionally includes thorough review of current medical information, gathering information on past medical history/social history and prior level of function, interpretation of standardized testing/informal observation of tasks, assessment of data and development of plan of care and goals.             Irene Freedman, OTR/L, [id number]

## 2022-06-23 LAB
ALBUMIN SERPL-MCNC: 3.5 G/DL (ref 3.5–5.2)
ALP BLD-CCNC: 80 U/L (ref 40–129)
ALT SERPL-CCNC: 14 U/L (ref 0–40)
ANION GAP SERPL CALCULATED.3IONS-SCNC: 8 MMOL/L (ref 7–16)
AST SERPL-CCNC: 22 U/L (ref 0–39)
BILIRUB SERPL-MCNC: 0.4 MG/DL (ref 0–1.2)
BUN BLDV-MCNC: 17 MG/DL (ref 6–23)
CALCIUM SERPL-MCNC: 9 MG/DL (ref 8.6–10.2)
CHLORIDE BLD-SCNC: 106 MMOL/L (ref 98–107)
CHOLESTEROL, TOTAL: 132 MG/DL (ref 0–199)
CO2: 27 MMOL/L (ref 22–29)
CREAT SERPL-MCNC: 1.3 MG/DL (ref 0.7–1.2)
GFR AFRICAN AMERICAN: >60
GFR NON-AFRICAN AMERICAN: 51 ML/MIN/1.73
GLUCOSE BLD-MCNC: 126 MG/DL (ref 74–99)
HBA1C MFR BLD: 6.4 % (ref 4–5.6)
HCT VFR BLD CALC: 39.6 % (ref 37–54)
HDLC SERPL-MCNC: 47 MG/DL
HEMOGLOBIN: 12.3 G/DL (ref 12.5–16.5)
LDL CHOLESTEROL CALCULATED: 72 MG/DL (ref 0–99)
MCH RBC QN AUTO: 30.2 PG (ref 26–35)
MCHC RBC AUTO-ENTMCNC: 31.1 % (ref 32–34.5)
MCV RBC AUTO: 97.3 FL (ref 80–99.9)
PDW BLD-RTO: 14.1 FL (ref 11.5–15)
PLATELET # BLD: 201 E9/L (ref 130–450)
PMV BLD AUTO: 11.4 FL (ref 7–12)
POTASSIUM SERPL-SCNC: 4.6 MMOL/L (ref 3.5–5)
RBC # BLD: 4.07 E12/L (ref 3.8–5.8)
SODIUM BLD-SCNC: 141 MMOL/L (ref 132–146)
TOTAL PROTEIN: 5.8 G/DL (ref 6.4–8.3)
TRIGL SERPL-MCNC: 67 MG/DL (ref 0–149)
URINE CULTURE, ROUTINE: NORMAL
VITAMIN D 25-HYDROXY: 42 NG/ML (ref 30–100)
VLDLC SERPL CALC-MCNC: 13 MG/DL
WBC # BLD: 9.1 E9/L (ref 4.5–11.5)

## 2022-06-30 LAB
ANION GAP SERPL CALCULATED.3IONS-SCNC: 13 MMOL/L (ref 7–16)
BUN BLDV-MCNC: 23 MG/DL (ref 6–23)
CALCIUM SERPL-MCNC: 8.8 MG/DL (ref 8.6–10.2)
CHLORIDE BLD-SCNC: 107 MMOL/L (ref 98–107)
CO2: 23 MMOL/L (ref 22–29)
CREAT SERPL-MCNC: 1.3 MG/DL (ref 0.7–1.2)
GFR AFRICAN AMERICAN: >60
GFR NON-AFRICAN AMERICAN: 51 ML/MIN/1.73
GLUCOSE BLD-MCNC: 104 MG/DL (ref 74–99)
HCT VFR BLD CALC: 37.9 % (ref 37–54)
HEMOGLOBIN: 11.8 G/DL (ref 12.5–16.5)
MCH RBC QN AUTO: 30.6 PG (ref 26–35)
MCHC RBC AUTO-ENTMCNC: 31.1 % (ref 32–34.5)
MCV RBC AUTO: 98.4 FL (ref 80–99.9)
PDW BLD-RTO: 14.6 FL (ref 11.5–15)
PLATELET # BLD: 203 E9/L (ref 130–450)
PMV BLD AUTO: 11.5 FL (ref 7–12)
POTASSIUM SERPL-SCNC: 4.6 MMOL/L (ref 3.5–5)
RBC # BLD: 3.85 E12/L (ref 3.8–5.8)
SODIUM BLD-SCNC: 143 MMOL/L (ref 132–146)
WBC # BLD: 10.4 E9/L (ref 4.5–11.5)

## 2022-07-07 LAB
ANION GAP SERPL CALCULATED.3IONS-SCNC: 8 MMOL/L (ref 7–16)
BASOPHILS ABSOLUTE: 0.05 E9/L (ref 0–0.2)
BASOPHILS RELATIVE PERCENT: 0.6 % (ref 0–2)
BUN BLDV-MCNC: 16 MG/DL (ref 6–23)
CALCIUM SERPL-MCNC: 8.8 MG/DL (ref 8.6–10.2)
CHLORIDE BLD-SCNC: 108 MMOL/L (ref 98–107)
CO2: 26 MMOL/L (ref 22–29)
CREAT SERPL-MCNC: 1.1 MG/DL (ref 0.7–1.2)
EOSINOPHILS ABSOLUTE: 0.16 E9/L (ref 0.05–0.5)
EOSINOPHILS RELATIVE PERCENT: 1.8 % (ref 0–6)
GFR AFRICAN AMERICAN: >60
GFR NON-AFRICAN AMERICAN: >60 ML/MIN/1.73
GLUCOSE BLD-MCNC: 88 MG/DL (ref 74–99)
HCT VFR BLD CALC: 38.2 % (ref 37–54)
HEMOGLOBIN: 12.2 G/DL (ref 12.5–16.5)
IMMATURE GRANULOCYTES #: 0.07 E9/L
IMMATURE GRANULOCYTES %: 0.8 % (ref 0–5)
LYMPHOCYTES ABSOLUTE: 1.78 E9/L (ref 1.5–4)
LYMPHOCYTES RELATIVE PERCENT: 19.8 % (ref 20–42)
MCH RBC QN AUTO: 30.6 PG (ref 26–35)
MCHC RBC AUTO-ENTMCNC: 31.9 % (ref 32–34.5)
MCV RBC AUTO: 95.7 FL (ref 80–99.9)
MONOCYTES ABSOLUTE: 0.91 E9/L (ref 0.1–0.95)
MONOCYTES RELATIVE PERCENT: 10.1 % (ref 2–12)
NEUTROPHILS ABSOLUTE: 6 E9/L (ref 1.8–7.3)
NEUTROPHILS RELATIVE PERCENT: 66.9 % (ref 43–80)
PDW BLD-RTO: 14.1 FL (ref 11.5–15)
PLATELET # BLD: 218 E9/L (ref 130–450)
PMV BLD AUTO: 11.2 FL (ref 7–12)
POTASSIUM SERPL-SCNC: 4.9 MMOL/L (ref 3.5–5)
RBC # BLD: 3.99 E12/L (ref 3.8–5.8)
SODIUM BLD-SCNC: 142 MMOL/L (ref 132–146)
WBC # BLD: 9 E9/L (ref 4.5–11.5)

## 2022-07-14 LAB
ANION GAP SERPL CALCULATED.3IONS-SCNC: 7 MMOL/L (ref 7–16)
BUN BLDV-MCNC: 20 MG/DL (ref 6–23)
CALCIUM SERPL-MCNC: 8.6 MG/DL (ref 8.6–10.2)
CHLORIDE BLD-SCNC: 103 MMOL/L (ref 98–107)
CO2: 27 MMOL/L (ref 22–29)
CREAT SERPL-MCNC: 1.1 MG/DL (ref 0.7–1.2)
GFR AFRICAN AMERICAN: >60
GFR NON-AFRICAN AMERICAN: >60 ML/MIN/1.73
GLUCOSE BLD-MCNC: 79 MG/DL (ref 74–99)
HCT VFR BLD CALC: 39.1 % (ref 37–54)
HEMOGLOBIN: 12.4 G/DL (ref 12.5–16.5)
MCH RBC QN AUTO: 30.5 PG (ref 26–35)
MCHC RBC AUTO-ENTMCNC: 31.7 % (ref 32–34.5)
MCV RBC AUTO: 96.3 FL (ref 80–99.9)
PDW BLD-RTO: 14 FL (ref 11.5–15)
PLATELET # BLD: 229 E9/L (ref 130–450)
PMV BLD AUTO: 11.3 FL (ref 7–12)
POTASSIUM SERPL-SCNC: 4.1 MMOL/L (ref 3.5–5)
RBC # BLD: 4.06 E12/L (ref 3.8–5.8)
SODIUM BLD-SCNC: 137 MMOL/L (ref 132–146)
WBC # BLD: 8.7 E9/L (ref 4.5–11.5)

## 2023-06-09 PROBLEM — N17.9 AKI (ACUTE KIDNEY INJURY) (HCC): Status: ACTIVE | Noted: 2023-06-09

## 2025-04-29 ENCOUNTER — APPOINTMENT (OUTPATIENT)
Dept: GENERAL RADIOLOGY | Age: 89
End: 2025-04-29
Payer: MEDICARE

## 2025-04-29 ENCOUNTER — HOSPITAL ENCOUNTER (EMERGENCY)
Age: 89
Discharge: HOME | End: 2025-04-29
Payer: MEDICARE

## 2025-04-29 ENCOUNTER — APPOINTMENT (OUTPATIENT)
Dept: CT IMAGING | Age: 89
End: 2025-04-29
Payer: MEDICARE

## 2025-04-29 VITALS
WEIGHT: 160 LBS | TEMPERATURE: 98.1 F | HEART RATE: 69 BPM | BODY MASS INDEX: 21.67 KG/M2 | SYSTOLIC BLOOD PRESSURE: 121 MMHG | DIASTOLIC BLOOD PRESSURE: 81 MMHG | OXYGEN SATURATION: 96 % | RESPIRATION RATE: 17 BRPM | HEIGHT: 72 IN

## 2025-04-29 DIAGNOSIS — S00.03XA SCALP HEMATOMA, INITIAL ENCOUNTER: Primary | ICD-10-CM

## 2025-04-29 DIAGNOSIS — S81.812A SKIN TEAR OF LEFT LOWER LEG WITHOUT COMPLICATION, INITIAL ENCOUNTER: ICD-10-CM

## 2025-04-29 DIAGNOSIS — R29.6 UNWITNESSED FALL: ICD-10-CM

## 2025-04-29 DIAGNOSIS — M17.12 TRICOMPARTMENT OSTEOARTHRITIS OF LEFT KNEE: ICD-10-CM

## 2025-04-29 DIAGNOSIS — S01.81XA FOREHEAD LACERATION, INITIAL ENCOUNTER: ICD-10-CM

## 2025-04-29 DIAGNOSIS — S41.111A SKIN TEAR OF RIGHT UPPER EXTREMITY: ICD-10-CM

## 2025-04-29 PROCEDURE — 12001 RPR S/N/AX/GEN/TRNK 2.5CM/<: CPT

## 2025-04-29 PROCEDURE — 73110 X-RAY EXAM OF WRIST: CPT

## 2025-04-29 PROCEDURE — 70450 CT HEAD/BRAIN W/O DYE: CPT

## 2025-04-29 PROCEDURE — 71111 X-RAY EXAM RIBS/CHEST4/> VWS: CPT

## 2025-04-29 PROCEDURE — 73564 X-RAY EXAM KNEE 4 OR MORE: CPT

## 2025-04-29 PROCEDURE — 73521 X-RAY EXAM HIPS BI 2 VIEWS: CPT

## 2025-04-29 PROCEDURE — 72125 CT NECK SPINE W/O DYE: CPT

## 2025-04-29 PROCEDURE — 12011 RPR F/E/E/N/L/M 2.5 CM/<: CPT

## 2025-04-29 PROCEDURE — 99284 EMERGENCY DEPT VISIT MOD MDM: CPT

## 2025-04-29 RX ORDER — ACETAMINOPHEN 325 MG/1
650 TABLET ORAL ONCE
Status: DISCONTINUED | OUTPATIENT
Start: 2025-04-29 | End: 2025-04-29 | Stop reason: HOSPADM

## 2025-04-29 ASSESSMENT — LIFESTYLE VARIABLES
HOW MANY STANDARD DRINKS CONTAINING ALCOHOL DO YOU HAVE ON A TYPICAL DAY: PATIENT UNABLE TO ANSWER
HOW OFTEN DO YOU HAVE A DRINK CONTAINING ALCOHOL: PATIENT UNABLE TO ANSWER

## 2025-04-29 NOTE — ED PROVIDER NOTES
Independent [facility] Visit.            [facility] EMERGENCY DEPARTMENT  ED  Encounter Note  Admit Date/RoomTime: 2025 11:03 AM  ED Room:   NAME: Ruben Gudino  : 10/9/1927  MRN: [id number]  PCP: Christie Brady MD    CHIEF COMPLAINT     Fall (Unwitnessed fall; head injury, - BT. A/O 1 baseline )    HISTORY OF PRESENT ILLNESS        Ruben Gudino is a 89 y.o. male who presents to the ED by ambulance from outside facility for unwitnessed fall at the nursing facility, beginning an unknown time ago.  He has a right forehead laceration, skin tear to his right ulnar aspect of the wrist, left knee skin tear and denies any complaints at present time.  Patient is confused at baseline history of dementia.  The complaint has been stable and persistent and are mild in severity.  He is not on any anticoagulation.  He denies any chest pain, shortness of breath, back pain, abdominal pain, nausea, vomiting, dizziness, extremity pain and unreliable at this time because of dementia.  Patient following commands.    REVIEW OF SYSTEMS     Pertinent positives and negatives are stated within HPI, all other systems reviewed and are negative.    Past Medical History:  has a past medical history of ADD (attention deficit disorder), Anxiety, B12 deficiency, B12 deficiency, Depression, Diabetes (HCC), Diabetes mellitus (HCC), and Hyperlipidemia.  Surgical History:  has a past surgical history that includes Hand surgery.  Social History:  reports that he has quit smoking. He has never used smokeless tobacco. He reports that he does not drink alcohol and does not use drugs.  Family History: family history is not on file.   Allergies: Sulfa antibiotics  CURRENT MEDICATIONS       Discharge Medication List as of 2025  6:33 PM        CONTINUE these medications which have NOT CHANGED    Details   melatonin 3 MG TABS tablet Take 1 tablet by mouth at bedtime, R-3DC to [facility]      citalopram (CELEXA) 20 MG tablet